# Patient Record
Sex: FEMALE | Race: BLACK OR AFRICAN AMERICAN | NOT HISPANIC OR LATINO | URBAN - METROPOLITAN AREA
[De-identification: names, ages, dates, MRNs, and addresses within clinical notes are randomized per-mention and may not be internally consistent; named-entity substitution may affect disease eponyms.]

---

## 2021-12-03 ENCOUNTER — INPATIENT (INPATIENT)
Facility: HOSPITAL | Age: 66
LOS: 8 days | Discharge: HOME | DRG: 177 | End: 2021-12-12
Attending: INTERNAL MEDICINE | Admitting: INTERNAL MEDICINE
Payer: COMMERCIAL

## 2021-12-03 VITALS
TEMPERATURE: 99 F | OXYGEN SATURATION: 95 % | HEART RATE: 95 BPM | HEIGHT: 64 IN | WEIGHT: 171.52 LBS | DIASTOLIC BLOOD PRESSURE: 83 MMHG | SYSTOLIC BLOOD PRESSURE: 105 MMHG | RESPIRATION RATE: 20 BRPM

## 2021-12-03 DIAGNOSIS — Z29.9 ENCOUNTER FOR PROPHYLACTIC MEASURES, UNSPECIFIED: ICD-10-CM

## 2021-12-03 DIAGNOSIS — U07.1 COVID-19: ICD-10-CM

## 2021-12-03 DIAGNOSIS — Z90.49 ACQUIRED ABSENCE OF OTHER SPECIFIED PARTS OF DIGESTIVE TRACT: Chronic | ICD-10-CM

## 2021-12-03 DIAGNOSIS — E11.9 TYPE 2 DIABETES MELLITUS WITHOUT COMPLICATIONS: ICD-10-CM

## 2021-12-03 DIAGNOSIS — I10 ESSENTIAL (PRIMARY) HYPERTENSION: ICD-10-CM

## 2021-12-03 DIAGNOSIS — E03.9 HYPOTHYROIDISM, UNSPECIFIED: ICD-10-CM

## 2021-12-03 LAB
ALBUMIN SERPL ELPH-MCNC: 2.6 G/DL — LOW (ref 3.5–5)
ALP SERPL-CCNC: 56 U/L — SIGNIFICANT CHANGE UP (ref 40–120)
ALT FLD-CCNC: 23 U/L DA — SIGNIFICANT CHANGE UP (ref 10–60)
ANION GAP SERPL CALC-SCNC: 9 MMOL/L — SIGNIFICANT CHANGE UP (ref 5–17)
APTT BLD: 33.7 SEC — SIGNIFICANT CHANGE UP (ref 27.5–35.5)
AST SERPL-CCNC: 27 U/L — SIGNIFICANT CHANGE UP (ref 10–40)
BASOPHILS # BLD AUTO: 0.02 K/UL — SIGNIFICANT CHANGE UP (ref 0–0.2)
BASOPHILS NFR BLD AUTO: 0.3 % — SIGNIFICANT CHANGE UP (ref 0–2)
BILIRUB SERPL-MCNC: 0.6 MG/DL — SIGNIFICANT CHANGE UP (ref 0.2–1.2)
BUN SERPL-MCNC: 12 MG/DL — SIGNIFICANT CHANGE UP (ref 7–18)
CALCIUM SERPL-MCNC: 9.4 MG/DL — SIGNIFICANT CHANGE UP (ref 8.4–10.5)
CHLORIDE SERPL-SCNC: 98 MMOL/L — SIGNIFICANT CHANGE UP (ref 96–108)
CO2 SERPL-SCNC: 25 MMOL/L — SIGNIFICANT CHANGE UP (ref 22–31)
CREAT SERPL-MCNC: 1.04 MG/DL — SIGNIFICANT CHANGE UP (ref 0.5–1.3)
EOSINOPHIL # BLD AUTO: 0 K/UL — SIGNIFICANT CHANGE UP (ref 0–0.5)
EOSINOPHIL NFR BLD AUTO: 0 % — SIGNIFICANT CHANGE UP (ref 0–6)
GLUCOSE BLDC GLUCOMTR-MCNC: 173 MG/DL — HIGH (ref 70–99)
GLUCOSE SERPL-MCNC: 193 MG/DL — HIGH (ref 70–99)
HCT VFR BLD CALC: 38.6 % — SIGNIFICANT CHANGE UP (ref 34.5–45)
HGB BLD-MCNC: 12.7 G/DL — SIGNIFICANT CHANGE UP (ref 11.5–15.5)
IMM GRANULOCYTES NFR BLD AUTO: 0.7 % — SIGNIFICANT CHANGE UP (ref 0–1.5)
INR BLD: 1.23 RATIO — HIGH (ref 0.88–1.16)
LACTATE SERPL-SCNC: 1.8 MMOL/L — SIGNIFICANT CHANGE UP (ref 0.7–2)
LYMPHOCYTES # BLD AUTO: 1.32 K/UL — SIGNIFICANT CHANGE UP (ref 1–3.3)
LYMPHOCYTES # BLD AUTO: 22 % — SIGNIFICANT CHANGE UP (ref 13–44)
MCHC RBC-ENTMCNC: 29.7 PG — SIGNIFICANT CHANGE UP (ref 27–34)
MCHC RBC-ENTMCNC: 32.9 GM/DL — SIGNIFICANT CHANGE UP (ref 32–36)
MCV RBC AUTO: 90.2 FL — SIGNIFICANT CHANGE UP (ref 80–100)
MONOCYTES # BLD AUTO: 0.42 K/UL — SIGNIFICANT CHANGE UP (ref 0–0.9)
MONOCYTES NFR BLD AUTO: 7 % — SIGNIFICANT CHANGE UP (ref 2–14)
NEUTROPHILS # BLD AUTO: 4.21 K/UL — SIGNIFICANT CHANGE UP (ref 1.8–7.4)
NEUTROPHILS NFR BLD AUTO: 70 % — SIGNIFICANT CHANGE UP (ref 43–77)
NRBC # BLD: 0 /100 WBCS — SIGNIFICANT CHANGE UP (ref 0–0)
NT-PROBNP SERPL-SCNC: 155 PG/ML — HIGH (ref 0–125)
PLATELET # BLD AUTO: 207 K/UL — SIGNIFICANT CHANGE UP (ref 150–400)
POTASSIUM SERPL-MCNC: 4.2 MMOL/L — SIGNIFICANT CHANGE UP (ref 3.5–5.3)
POTASSIUM SERPL-SCNC: 4.2 MMOL/L — SIGNIFICANT CHANGE UP (ref 3.5–5.3)
PROT SERPL-MCNC: 7.9 G/DL — SIGNIFICANT CHANGE UP (ref 6–8.3)
PROTHROM AB SERPL-ACNC: 14.5 SEC — HIGH (ref 10.6–13.6)
RBC # BLD: 4.28 M/UL — SIGNIFICANT CHANGE UP (ref 3.8–5.2)
RBC # FLD: 12.9 % — SIGNIFICANT CHANGE UP (ref 10.3–14.5)
SARS-COV-2 RNA SPEC QL NAA+PROBE: DETECTED
SODIUM SERPL-SCNC: 132 MMOL/L — LOW (ref 135–145)
TROPONIN I, HIGH SENSITIVITY RESULT: 49.6 NG/L — SIGNIFICANT CHANGE UP
WBC # BLD: 6.01 K/UL — SIGNIFICANT CHANGE UP (ref 3.8–10.5)
WBC # FLD AUTO: 6.01 K/UL — SIGNIFICANT CHANGE UP (ref 3.8–10.5)

## 2021-12-03 PROCEDURE — 99285 EMERGENCY DEPT VISIT HI MDM: CPT

## 2021-12-03 PROCEDURE — 71046 X-RAY EXAM CHEST 2 VIEWS: CPT | Mod: 26

## 2021-12-03 PROCEDURE — 93010 ELECTROCARDIOGRAM REPORT: CPT | Mod: 77

## 2021-12-03 PROCEDURE — 93010 ELECTROCARDIOGRAM REPORT: CPT

## 2021-12-03 RX ORDER — DEXAMETHASONE 0.5 MG/5ML
6 ELIXIR ORAL ONCE
Refills: 0 | Status: COMPLETED | OUTPATIENT
Start: 2021-12-03 | End: 2021-12-03

## 2021-12-03 RX ORDER — REMDESIVIR 5 MG/ML
200 INJECTION INTRAVENOUS EVERY 24 HOURS
Refills: 0 | Status: COMPLETED | OUTPATIENT
Start: 2021-12-03 | End: 2021-12-04

## 2021-12-03 RX ORDER — INSULIN LISPRO 100/ML
VIAL (ML) SUBCUTANEOUS AT BEDTIME
Refills: 0 | Status: DISCONTINUED | OUTPATIENT
Start: 2021-12-03 | End: 2021-12-12

## 2021-12-03 RX ORDER — METOPROLOL TARTRATE 50 MG
1 TABLET ORAL
Qty: 0 | Refills: 0 | DISCHARGE

## 2021-12-03 RX ORDER — ENOXAPARIN SODIUM 100 MG/ML
40 INJECTION SUBCUTANEOUS DAILY
Refills: 0 | Status: DISCONTINUED | OUTPATIENT
Start: 2021-12-03 | End: 2021-12-12

## 2021-12-03 RX ORDER — LEVOTHYROXINE SODIUM 125 MCG
88 TABLET ORAL DAILY
Refills: 0 | Status: DISCONTINUED | OUTPATIENT
Start: 2021-12-03 | End: 2021-12-12

## 2021-12-03 RX ORDER — METOPROLOL TARTRATE 50 MG
25 TABLET ORAL DAILY
Refills: 0 | Status: DISCONTINUED | OUTPATIENT
Start: 2021-12-03 | End: 2021-12-12

## 2021-12-03 RX ORDER — PANTOPRAZOLE SODIUM 20 MG/1
40 TABLET, DELAYED RELEASE ORAL
Refills: 0 | Status: DISCONTINUED | OUTPATIENT
Start: 2021-12-03 | End: 2021-12-12

## 2021-12-03 RX ORDER — LEVOTHYROXINE SODIUM 125 MCG
1 TABLET ORAL
Qty: 0 | Refills: 0 | DISCHARGE

## 2021-12-03 RX ORDER — LOSARTAN POTASSIUM 100 MG/1
1 TABLET, FILM COATED ORAL
Qty: 0 | Refills: 0 | DISCHARGE

## 2021-12-03 RX ORDER — SODIUM CHLORIDE 9 MG/ML
1000 INJECTION INTRAMUSCULAR; INTRAVENOUS; SUBCUTANEOUS ONCE
Refills: 0 | Status: COMPLETED | OUTPATIENT
Start: 2021-12-03 | End: 2021-12-03

## 2021-12-03 RX ORDER — PANTOPRAZOLE SODIUM 20 MG/1
40 TABLET, DELAYED RELEASE ORAL ONCE
Refills: 0 | Status: COMPLETED | OUTPATIENT
Start: 2021-12-03 | End: 2021-12-04

## 2021-12-03 RX ORDER — REMDESIVIR 5 MG/ML
100 INJECTION INTRAVENOUS EVERY 24 HOURS
Refills: 0 | Status: COMPLETED | OUTPATIENT
Start: 2021-12-04 | End: 2021-12-07

## 2021-12-03 RX ORDER — AMLODIPINE BESYLATE 2.5 MG/1
1 TABLET ORAL
Qty: 0 | Refills: 0 | DISCHARGE

## 2021-12-03 RX ORDER — REMDESIVIR 5 MG/ML
INJECTION INTRAVENOUS
Refills: 0 | Status: COMPLETED | OUTPATIENT
Start: 2021-12-04 | End: 2021-12-07

## 2021-12-03 RX ORDER — INSULIN LISPRO 100/ML
VIAL (ML) SUBCUTANEOUS
Refills: 0 | Status: DISCONTINUED | OUTPATIENT
Start: 2021-12-03 | End: 2021-12-12

## 2021-12-03 RX ADMIN — SODIUM CHLORIDE 1000 MILLILITER(S): 9 INJECTION INTRAMUSCULAR; INTRAVENOUS; SUBCUTANEOUS at 16:27

## 2021-12-03 RX ADMIN — Medication 6 MILLIGRAM(S): at 22:54

## 2021-12-03 NOTE — ED PROVIDER NOTE - CPE EDP CARDIAC NORM
From: Jania Younger  To: Juliette Howard MD  Sent: 2/22/2018 1:54 PM CST  Subject: medication    I was wondering if I can take the suppositories the same day I use the premarin??    Thanks,  Joann Younger   normal...

## 2021-12-03 NOTE — ED ADULT NURSE NOTE - DRUG PRE-SCREENING (DAST -1)
Gladys Whitehead, PGY1   Contact/Pager - [phone] / [phone]    SUBJECTIVE / OVERNIGHT EVENTS:  -no acute events overnight  -denies any complaints including CP, SOB, abdominal pain, N/V, diarrhea, constipation, headache, confusion, fever/chills      MEDICATIONS  (STANDING):  pantoprazole  Injectable 40 milliGRAM(s) IV Push every 12 hours  sodium chloride 0.9%. 1000 milliLiter(s) (100 mL/Hr) IV Continuous <Continuous>    MEDICATIONS  (PRN):  trimethobenzamide 300 milliGRAM(s) Oral every 8 hours PRN Nausea and/or Vomiting      Allergies    No Known Allergies    Intolerances          Vital Signs Last 24 Hrs  T(C): 36.8 (2019 04:26), Max: 37 (2019 16:57)  T(F): 98.3 (2019 04:26), Max: 98.6 (2019 16:57)  HR: 77 (2019 04:26) (77 - 91)  BP: 152/95 (2019 04:26) (132/91 - 152/100)  BP(mean): --  RR: 18 (2019 04:26) (18 - 18)  SpO2: 99% (2019 04:26) (98% - 100%)  CAPILLARY BLOOD GLUCOSE        I&O's Summary    2019 07:01  -  2019 07:00  --------------------------------------------------------  IN: 1000 mL / OUT: 0 mL / NET: 1000 mL          PHYSICAL EXAM:  GENERAL: NAD, well-developed  HEAD:  AT, NC  EYES: EOMI, PERRLA, conjunctiva and sclera clear  ENMT: Airway patent. MMM. Good dentition, no lesions.  NECK: Supple, No JVD  CHEST/LUNG: CTABL; No wheezing, rhonci, or rales  HEART: RRR; Normal S1, S2. No murmurs, rubs, or gallops  ABDOMEN: Soft, NT, ND; Bowel sounds present. No organomegaly  EXTREMITIES:  2+ Peripheral Pulses, No clubbing, cyanosis, or edema  PSYCH: AAOx3  NEUROLOGY: non-focal  SKIN: Warm, dry, intact; No rashes or lesions      LABS:                        12.8   3.7   )-----------( x        ( 2019 06:12 )             36.8         138  |  99  |  29<H>  ----------------------------<  104<H>  3.2<L>   |  26  |  1.54<H>    Ca    8.7      2019 06:11  Phos  3.1       Mg     2.3         TPro  7.1  /  Alb  4.2  /  TBili  0.9  /  DBili  x   /  AST  56<H>  /  ALT  42  /  AlkPhos  50  12    LIVER FUNCTIONS - ( 2019 06:11 )  Alb: 4.2 g/dL / Pro: 7.1 g/dL / ALK PHOS: 50 U/L / ALT: 42 U/L / AST: 56 U/L / GGT: x           PT/INR - ( 2019 06:12 )   PT: 12.9 sec;   INR: 1.13 ratio         PTT - ( 2019 06:12 )  PTT:25.4 sec      Urinalysis Basic - ( 2019 00:17 )    Color: Light Yellow / Appearance: Clear / S.012 / pH: x  Gluc: x / Ketone: Trace  / Bili: Negative / Urobili: Negative   Blood: x / Protein: Trace / Nitrite: Negative   Leuk Esterase: Negative / RBC: 4 /hpf / WBC 3 /HPF   Sq Epi: x / Non Sq Epi: 1 /hpf / Bacteria: 0.0              RADIOLOGY & ADDITIONAL TESTS:    Imaging Personally Reviewed: YES    Consultant(s) Notes Reviewed: YES    Care Discussed with Consultants/Other Providers: YES Statement Selected

## 2021-12-03 NOTE — ED PROVIDER NOTE - CARE PLAN
1 Principal Discharge DX:	Pneumonia due to COVID-19 virus  Secondary Diagnosis:	Hypoxemia requiring supplemental oxygen

## 2021-12-03 NOTE — ED PROVIDER NOTE - OBJECTIVE STATEMENT
66 y.o female with a history of hypertension and diabetes mellitus type II is presenting with x1 week of cough and diarrhea followed by shortness of breath which started yesterday. Patient reports of receiving 1 of 2 COVID-19 vaccinations. Patient denies leg edema, fever, travel, or sick contacts.

## 2021-12-03 NOTE — ED PROVIDER NOTE - CLINICAL SUMMARY MEDICAL DECISION MAKING FREE TEXT BOX
66 y.o female presenting with x1 week of cough and diarrhea along with shortness of breath starting yesterday. Symptoms suggest pneumonia or possible COVID-19. Will perform chest x-ray, COVID-19 swab, and reassess.

## 2021-12-03 NOTE — H&P ADULT - PROBLEM SELECTOR PLAN 4
- Patient with hx of hypothyroidism on Synthroid at home   - C/w home meds   - F/u TSH and adjust medications if needed

## 2021-12-03 NOTE — H&P ADULT - ATTENDING COMMENTS
PATIENT WAS SEEN AND EXAMINED , D/W ER MD, RAKAN    - HYPOXIC RESPIRATORY FAILURE DUE TO COVID 19 PNEUMONIA - STARTED ON IV.REMDESVIR AND DECDRON, O2 SUPPLEMENTS. ICU CONSULT TO BE CALLED BY RAKAN TO EVALUATE HYPOXIA USING NRB  - ISOLATION DROPLET   - GI AND DVT PROPHYLAXIS    - DR. SUN LEARY

## 2021-12-03 NOTE — ED ADULT TRIAGE NOTE - HEIGHT IN INCHES
Pain in knee started Saturday. States turned wrong. Felt like knee 'gave out' a little. Now feeling some grinding in knee. Reports swelling, sore. Today is somewhat better. Right knee. No prior injury. Has been resting, icing and taking it easy. feeling some better today.
4

## 2021-12-03 NOTE — H&P ADULT - HISTORY OF PRESENT ILLNESS
66 year old female from home, lives alone, has past medical hx of hypertension, DM, hypothyroidism came to ED c/o shortness of breath for 2 days that worsened today associated to dry cough and fatigue. As per patient, generalized weakness started about 4 days ago associated to tiredness, poor appetite and low energy. Patient denies any fever, chills, sick contacts, nausea, vomiting, diarrhea, bleeding, chest pain or palpitations. Of note patient was vaccinated on April with [employer].

## 2021-12-03 NOTE — ED ADULT NURSE NOTE - OBJECTIVE STATEMENT
pt is here for shortness of breath.  pt stated that diarrhea, cold and coughing x 1 week, getting worseing coughing, SOB, and weakness today, fever

## 2021-12-03 NOTE — H&P ADULT - PROBLEM SELECTOR PLAN 3
- Patient has hx of DM on glipizide, pioglitazone, alogliptin metformin, dapagliflozin at home  - Holding home po medications  - Started on HSS  - Fingerstick before meals and at bedtime  - DASH diet with consistent carb  - Target -180  - F/u A1c

## 2021-12-03 NOTE — H&P ADULT - PROBLEM SELECTOR PLAN 1
- On admission, patient was c/o cough, sob, sat 77% on RA and 93-96% on NRB 15L  - CXR showed bilateral opacities  - EKG NSR, troponin x1 neg  - COVID19 PCR +ve  - F/u COVID19 AB, LDH, Procalcitonin, ferritin, D-Dimer, CRP every 3 days   - F/u Blood cultures, urine Legionella, Strep antigen, Mycoplasma antigen   - Tylenol, Albuterol Inhaler, Robitussin PRN  - Monitor respiratory status   - Started on Decadron 6 mg IV daily for 10 days and Remdesivir   - C/w O2 supplementation to keep O2 sat >92%  - Isolation precautions - On admission, patient was c/o cough, sob, sat 77% on RA and 93-96% on NRB 15L  - CXR showed bilateral opacities  - EKG NSR, troponin x1 neg  - COVID19 PCR +ve  - F/u COVID19 AB, LDH, Procalcitonin, ferritin, D-Dimer, CRP every 3 days   - F/u Blood cultures, urine Legionella, Strep antigen, Mycoplasma antigen   - Tylenol, Albuterol Inhaler, Robitussin PRN  - Monitor respiratory status   - Started on Decadron 6 mg IV daily for 10 days and Remdesivir   - C/w O2 supplementation to keep O2 sat >92%  - F/u CTA chest to r/o PE  - Monitor for hemodynamic instability   - Isolation precautions

## 2021-12-03 NOTE — H&P ADULT - PROBLEM SELECTOR PLAN 2
- Patient has history of Hypertension on amlodipine, metoprolol, losartan at home   - Hold meds for now as patient is normotensive   - DASH diet  - Monitor BP and resume meds as needed

## 2021-12-03 NOTE — PROGRESS NOTE ADULT - SUBJECTIVE AND OBJECTIVE BOX
Patient is a 66y old  Female who presents with a chief complaint of AHRF 2/2 COVID19 PNA (03 Dec 2021 20:55)    PATIENT IS SEEN AND EXAMINED IN MEDICAL FLOOR.    OZZY [    ]    JUAN PABLO [   ]      GT [   ]    ALLERGIES:  No Known Allergies      Daily Height in cm: 162.56 (03 Dec 2021 15:35)    Daily     VITALS:    Vital Signs Last 24 Hrs  T(C): 37.3 (03 Dec 2021 15:35), Max: 37.3 (03 Dec 2021 15:35)  T(F): 99.1 (03 Dec 2021 15:35), Max: 99.1 (03 Dec 2021 15:35)  HR: 95 (03 Dec 2021 15:35) (95 - 95)  BP: 105/83 (03 Dec 2021 15:35) (105/83 - 105/83)  BP(mean): --  RR: 20 (03 Dec 2021 15:35) (20 - 20)  SpO2: 95% (03 Dec 2021 15:35) (95% - 95%)    LABS:    CBC Full  -  ( 03 Dec 2021 16:21 )  WBC Count : 6.01 K/uL  RBC Count : 4.28 M/uL  Hemoglobin : 12.7 g/dL  Hematocrit : 38.6 %  Platelet Count - Automated : 207 K/uL  Mean Cell Volume : 90.2 fl  Mean Cell Hemoglobin : 29.7 pg  Mean Cell Hemoglobin Concentration : 32.9 gm/dL  Auto Neutrophil # : 4.21 K/uL  Auto Lymphocyte # : 1.32 K/uL  Auto Monocyte # : 0.42 K/uL  Auto Eosinophil # : 0.00 K/uL  Auto Basophil # : 0.02 K/uL  Auto Neutrophil % : 70.0 %  Auto Lymphocyte % : 22.0 %  Auto Monocyte % : 7.0 %  Auto Eosinophil % : 0.0 %  Auto Basophil % : 0.3 %    PT/INR - ( 03 Dec 2021 16:21 )   PT: 14.5 sec;   INR: 1.23 ratio         PTT - ( 03 Dec 2021 16:21 )  PTT:33.7 sec  12-03    132<L>  |  98  |  12  ----------------------------<  193<H>  4.2   |  25  |  1.04    Ca    9.4      03 Dec 2021 16:21    TPro  7.9  /  Alb  2.6<L>  /  TBili  0.6  /  DBili  x   /  AST  27  /  ALT  23  /  AlkPhos  56  12-03    CAPILLARY BLOOD GLUCOSE      POCT Blood Glucose.: 192 mg/dL (03 Dec 2021 15:50)        LIVER FUNCTIONS - ( 03 Dec 2021 16:21 )  Alb: 2.6 g/dL / Pro: 7.9 g/dL / ALK PHOS: 56 U/L / ALT: 23 U/L DA / AST: 27 U/L / GGT: x           Creatinine Trend: 1.04<--  I&O's Summary              MEDICATIONS:    MEDICATIONS  (STANDING):  enoxaparin Injectable 40 milliGRAM(s) SubCutaneous daily  insulin lispro (ADMELOG) corrective regimen sliding scale   SubCutaneous three times a day before meals  insulin lispro (ADMELOG) corrective regimen sliding scale   SubCutaneous at bedtime  pantoprazole    Tablet 40 milliGRAM(s) Oral before breakfast  pantoprazole  Injectable 40 milliGRAM(s) IV Push once  remdesivir  IVPB   IV Intermittent   remdesivir  IVPB 200 milliGRAM(s) IV Intermittent every 24 hours      MEDICATIONS  (PRN):        REVIEW OF SYSTEMS:                           ALL ROS DONE [ X   ]      CONSTITUTIONAL:  LETHARGIC [   ], FEVER [   ], UNRESPONSIVE [   ]  CVS:  CP  [   ], SOB, [   ], PALPITATIONS [   ], DIZZYNESS [   ]  RS: COUGH [   ], SPUTUM [   ]  GI: ABDOMINAL PAIN [   ], NAUSEA [   ], VOMITINGS [   ], DIARRHEA [   ], CONSTIPATION [   ]  :  DYSURIA [   ], NOCTURIA [   ], INCREASED FREQUENCY [   ], DRIBLING [   ],  SKELETAL: PAINFUL JOINTS [   ], SWOLLEN JOINTS [   ], NECK ACHE [   ], LOW BACK ACHE [   ],  SKIN : ULCERS [   ], RASH [   ], ITCHING [   ]  CNS: HEAD ACHE [   ], DOUBLE VISION [   ], BLURRED VISION [   ], AMS / CONFUSION [   ], SEIZURES [   ], WEAKNESS [   ],TINGLING / NUMBNESS [   ]      PHYSICAL EXAMINATION:    GENERAL APPEARANCE: NO DISTRESS  HEENT:  NO PALLOR, NO  JVD,  NO   NODES, NECK SUPPLE  CVS: S1 +, S2 +,   RS: AEEB,  OCCASIONAL  RALES +,   NO RONCHI  ABD: SOFT, NT, NO, BS +  EXT: NO PE  SKIN: WARM,   SKELETAL:  ROM REDUCED AT CERVICAL & LS SPINE   CNS:  AAO X 3   , NO  DEFICITS        RADIOLOGY :    B/L LUNG INFILTRATES           ASSESSMENT :     Infection due to severe acute respiratory syndrome coronavirus 2 (SARS-CoV-2)    Hypertension    Diabetes mellitus    Hypothyroidism    History of cholecystectomy    History of appendectomy        PLAN:  HPI:  66 year old female from home, lives alone, has past medical hx of hypertension, DM, hypothyroidism came to ED c/o shortness of breath for 2 days that worsened today associated to dry cough and fatigue. As per patient, generalized weakness started about 4 days ago associated to tiredness, poor appetite and low energy. Patient denies any fever, chills, sick contacts, nausea, vomiting, diarrhea, bleeding, chest pain or palpitations. Of note patient was vaccinated on April with [employer]  (03 Dec 2021 20:55)    - HYPOXIC RESPIRATORY FAILURE DUE TO COVID 19 PNEUMONIA - STARTED ON IV.REMDESVIR AND DECDRON, O2 SUPPLEMENTS. ICU CONSULT TO BE CALLED BY RMD TO EVALUATE HYPOXIA USING NRB  - ISOLATION DROPLET   - GI AND DVT PROPHYLAXIS    - DR. SUN LEARY

## 2021-12-03 NOTE — H&P ADULT - NSHPREVIEWOFSYSTEMS_GEN_ALL_CORE
Constitutional- no fever, chills, weight loss, or weight gain, generalized weakness+  Eyes – no vision loss or changes, no pain, no redness  ENT  – No hearing changes, no tinnitus, no discharge, no pain  - No runny nose, no congestion, no pain  - No sore throat, no hoarseness, no mouth sores, no voice change  CVS – No chest pain/no palpitations, or SOB  Respiratory -  cough+, no hemoptysis, no wheezing, SOB+  GI – no dysphagia, heartburn, nausea, anorexia, emesis, diarrhea, abd pain, or change in bowel habits   – no frequency, urgency, hesitancy, nocturia, discharge, or weak stream  Skin – no rashes, lumps, or pruritus  MANSOOR – no joint pain, stiffness, back pain, redness or swelling in joints  Psych – no confusion, depression , anxiety, or insomnia  Neuro – no headache dizziness, syncope, seizures, tremors, numbness, amnesia, or falls  Endocrine – no cold, heat intolerance, sweating, excessive hunger or thirst

## 2021-12-03 NOTE — H&P ADULT - ASSESSMENT
66 year old female has past medical hx of hypertension, DM, hypothyroidism is admitted to medicine for AHRF 2/2 COVID19 PNA

## 2021-12-03 NOTE — ED ADULT TRIAGE NOTE - CHIEF COMPLAINT QUOTE
Diarrhea, cold and coughing x 1 week. Worsening coughing, SOB, and weakness today. PT denies CP, fever

## 2021-12-04 LAB
ALBUMIN SERPL ELPH-MCNC: 2.5 G/DL — LOW (ref 3.5–5)
ALP SERPL-CCNC: 61 U/L — SIGNIFICANT CHANGE UP (ref 40–120)
ALT FLD-CCNC: 22 U/L DA — SIGNIFICANT CHANGE UP (ref 10–60)
ANION GAP SERPL CALC-SCNC: 14 MMOL/L — SIGNIFICANT CHANGE UP (ref 5–17)
ANISOCYTOSIS BLD QL: SLIGHT — SIGNIFICANT CHANGE UP
APPEARANCE UR: CLEAR — SIGNIFICANT CHANGE UP
AST SERPL-CCNC: 23 U/L — SIGNIFICANT CHANGE UP (ref 10–40)
BACTERIA # UR AUTO: ABNORMAL /HPF
BASOPHILS # BLD AUTO: 0.07 K/UL — SIGNIFICANT CHANGE UP (ref 0–0.2)
BASOPHILS NFR BLD AUTO: 1 % — SIGNIFICANT CHANGE UP (ref 0–2)
BILIRUB SERPL-MCNC: 0.5 MG/DL — SIGNIFICANT CHANGE UP (ref 0.2–1.2)
BILIRUB UR-MCNC: NEGATIVE — SIGNIFICANT CHANGE UP
BUN SERPL-MCNC: 15 MG/DL — SIGNIFICANT CHANGE UP (ref 7–18)
CALCIUM SERPL-MCNC: 9.1 MG/DL — SIGNIFICANT CHANGE UP (ref 8.4–10.5)
CHLORIDE SERPL-SCNC: 102 MMOL/L — SIGNIFICANT CHANGE UP (ref 96–108)
CO2 SERPL-SCNC: 22 MMOL/L — SIGNIFICANT CHANGE UP (ref 22–31)
COLOR SPEC: YELLOW — SIGNIFICANT CHANGE UP
CREAT SERPL-MCNC: 0.8 MG/DL — SIGNIFICANT CHANGE UP (ref 0.5–1.3)
CRP SERPL-MCNC: 205 MG/L — HIGH
D DIMER BLD IA.RAPID-MCNC: 506 NG/ML DDU — HIGH
DACRYOCYTES BLD QL SMEAR: SLIGHT — SIGNIFICANT CHANGE UP
DIFF PNL FLD: NEGATIVE — SIGNIFICANT CHANGE UP
EOSINOPHIL # BLD AUTO: 0 K/UL — SIGNIFICANT CHANGE UP (ref 0–0.5)
EOSINOPHIL NFR BLD AUTO: 0 % — SIGNIFICANT CHANGE UP (ref 0–6)
EPI CELLS # UR: ABNORMAL /HPF
ERYTHROCYTE [SEDIMENTATION RATE] IN BLOOD: 92 MM/HR — HIGH (ref 0–20)
FERRITIN SERPL-MCNC: 628 NG/ML — HIGH (ref 15–150)
GIANT PLATELETS BLD QL SMEAR: PRESENT — SIGNIFICANT CHANGE UP
GLUCOSE BLDC GLUCOMTR-MCNC: 170 MG/DL — HIGH (ref 70–99)
GLUCOSE BLDC GLUCOMTR-MCNC: 245 MG/DL — HIGH (ref 70–99)
GLUCOSE BLDC GLUCOMTR-MCNC: 252 MG/DL — HIGH (ref 70–99)
GLUCOSE SERPL-MCNC: 210 MG/DL — HIGH (ref 70–99)
GLUCOSE UR QL: 1000 MG/DL
HCT VFR BLD CALC: 38.2 % — SIGNIFICANT CHANGE UP (ref 34.5–45)
HCV AB S/CO SERPL IA: 0.12 S/CO — SIGNIFICANT CHANGE UP (ref 0–0.99)
HCV AB SERPL-IMP: SIGNIFICANT CHANGE UP
HGB BLD-MCNC: 12.6 G/DL — SIGNIFICANT CHANGE UP (ref 11.5–15.5)
INR BLD: 1.28 RATIO — HIGH (ref 0.88–1.16)
KETONES UR-MCNC: ABNORMAL
LDH SERPL L TO P-CCNC: 337 U/L — HIGH (ref 120–225)
LEUKOCYTE ESTERASE UR-ACNC: NEGATIVE — SIGNIFICANT CHANGE UP
LYMPHOCYTES # BLD AUTO: 0.55 K/UL — LOW (ref 1–3.3)
LYMPHOCYTES # BLD AUTO: 8 % — LOW (ref 13–44)
MACROCYTES BLD QL: SLIGHT — SIGNIFICANT CHANGE UP
MAGNESIUM SERPL-MCNC: 2.3 MG/DL — SIGNIFICANT CHANGE UP (ref 1.6–2.6)
MANUAL SMEAR VERIFICATION: SIGNIFICANT CHANGE UP
MCHC RBC-ENTMCNC: 29.1 PG — SIGNIFICANT CHANGE UP (ref 27–34)
MCHC RBC-ENTMCNC: 33 GM/DL — SIGNIFICANT CHANGE UP (ref 32–36)
MCV RBC AUTO: 88.2 FL — SIGNIFICANT CHANGE UP (ref 80–100)
MONOCYTES # BLD AUTO: 0.34 K/UL — SIGNIFICANT CHANGE UP (ref 0–0.9)
MONOCYTES NFR BLD AUTO: 5 % — SIGNIFICANT CHANGE UP (ref 2–14)
NEUTROPHILS # BLD AUTO: 5.79 K/UL — SIGNIFICANT CHANGE UP (ref 1.8–7.4)
NEUTROPHILS NFR BLD AUTO: 84 % — HIGH (ref 43–77)
NITRITE UR-MCNC: NEGATIVE — SIGNIFICANT CHANGE UP
NRBC # BLD: 0 /100 — SIGNIFICANT CHANGE UP (ref 0–0)
OVALOCYTES BLD QL SMEAR: SLIGHT — SIGNIFICANT CHANGE UP
PH UR: 5 — SIGNIFICANT CHANGE UP (ref 5–8)
PHOSPHATE SERPL-MCNC: 4 MG/DL — SIGNIFICANT CHANGE UP (ref 2.5–4.5)
PLAT MORPH BLD: NORMAL — SIGNIFICANT CHANGE UP
PLATELET # BLD AUTO: 239 K/UL — SIGNIFICANT CHANGE UP (ref 150–400)
POTASSIUM SERPL-MCNC: 4.9 MMOL/L — SIGNIFICANT CHANGE UP (ref 3.5–5.3)
POTASSIUM SERPL-SCNC: 4.9 MMOL/L — SIGNIFICANT CHANGE UP (ref 3.5–5.3)
PROCALCITONIN SERPL-MCNC: 0.13 NG/ML — HIGH (ref 0.02–0.1)
PROT SERPL-MCNC: 7.3 G/DL — SIGNIFICANT CHANGE UP (ref 6–8.3)
PROT UR-MCNC: 30 MG/DL
PROTHROM AB SERPL-ACNC: 15 SEC — HIGH (ref 10.6–13.6)
RBC # BLD: 4.33 M/UL — SIGNIFICANT CHANGE UP (ref 3.8–5.2)
RBC # FLD: 12.5 % — SIGNIFICANT CHANGE UP (ref 10.3–14.5)
RBC BLD AUTO: ABNORMAL
RBC CASTS # UR COMP ASSIST: SIGNIFICANT CHANGE UP /HPF (ref 0–2)
SODIUM SERPL-SCNC: 138 MMOL/L — SIGNIFICANT CHANGE UP (ref 135–145)
SP GR SPEC: 1.02 — SIGNIFICANT CHANGE UP (ref 1.01–1.02)
TSH SERPL-MCNC: 1.6 UU/ML — SIGNIFICANT CHANGE UP (ref 0.34–4.82)
UROBILINOGEN FLD QL: NEGATIVE — SIGNIFICANT CHANGE UP
VARIANT LYMPHS # BLD: 2 % — SIGNIFICANT CHANGE UP (ref 0–6)
WBC # BLD: 6.89 K/UL — SIGNIFICANT CHANGE UP (ref 3.8–10.5)
WBC # FLD AUTO: 6.89 K/UL — SIGNIFICANT CHANGE UP (ref 3.8–10.5)
WBC UR QL: SIGNIFICANT CHANGE UP /HPF (ref 0–5)

## 2021-12-04 PROCEDURE — 99291 CRITICAL CARE FIRST HOUR: CPT

## 2021-12-04 RX ORDER — INFLUENZA VIRUS VACCINE 15; 15; 15; 15 UG/.5ML; UG/.5ML; UG/.5ML; UG/.5ML
0.7 SUSPENSION INTRAMUSCULAR ONCE
Refills: 0 | Status: COMPLETED | OUTPATIENT
Start: 2021-12-04 | End: 2021-12-04

## 2021-12-04 RX ORDER — DEXAMETHASONE 0.5 MG/5ML
6 ELIXIR ORAL DAILY
Refills: 0 | Status: COMPLETED | OUTPATIENT
Start: 2021-12-04 | End: 2021-12-12

## 2021-12-04 RX ORDER — ACETAMINOPHEN 500 MG
650 TABLET ORAL EVERY 6 HOURS
Refills: 0 | Status: DISCONTINUED | OUTPATIENT
Start: 2021-12-04 | End: 2021-12-12

## 2021-12-04 RX ORDER — CHLORHEXIDINE GLUCONATE 213 G/1000ML
1 SOLUTION TOPICAL
Refills: 0 | Status: DISCONTINUED | OUTPATIENT
Start: 2021-12-04 | End: 2021-12-09

## 2021-12-04 RX ORDER — SENNA PLUS 8.6 MG/1
2 TABLET ORAL AT BEDTIME
Refills: 0 | Status: DISCONTINUED | OUTPATIENT
Start: 2021-12-04 | End: 2021-12-05

## 2021-12-04 RX ORDER — ALBUTEROL 90 UG/1
2 AEROSOL, METERED ORAL EVERY 6 HOURS
Refills: 0 | Status: DISCONTINUED | OUTPATIENT
Start: 2021-12-04 | End: 2021-12-12

## 2021-12-04 RX ADMIN — ENOXAPARIN SODIUM 40 MILLIGRAM(S): 100 INJECTION SUBCUTANEOUS at 12:51

## 2021-12-04 RX ADMIN — Medication 200 MILLIGRAM(S): at 22:12

## 2021-12-04 RX ADMIN — Medication 2: at 06:20

## 2021-12-04 RX ADMIN — PANTOPRAZOLE SODIUM 40 MILLIGRAM(S): 20 TABLET, DELAYED RELEASE ORAL at 10:08

## 2021-12-04 RX ADMIN — Medication 1: at 21:20

## 2021-12-04 RX ADMIN — Medication 2: at 18:06

## 2021-12-04 RX ADMIN — Medication 1: at 13:55

## 2021-12-04 RX ADMIN — REMDESIVIR 200 MILLIGRAM(S): 5 INJECTION INTRAVENOUS at 02:09

## 2021-12-04 RX ADMIN — PANTOPRAZOLE SODIUM 40 MILLIGRAM(S): 20 TABLET, DELAYED RELEASE ORAL at 02:09

## 2021-12-04 RX ADMIN — Medication 6 MILLIGRAM(S): at 12:51

## 2021-12-04 RX ADMIN — SENNA PLUS 2 TABLET(S): 8.6 TABLET ORAL at 21:03

## 2021-12-04 RX ADMIN — CHLORHEXIDINE GLUCONATE 1 APPLICATION(S): 213 SOLUTION TOPICAL at 06:20

## 2021-12-04 NOTE — PROGRESS NOTE ADULT - SUBJECTIVE AND OBJECTIVE BOX
Patient is a 66y old  Female who presents with a chief complaint of AHRF 2/2 COVID19 PNA (04 Dec 2021 00:43)    PATIENT IS SEEN AND EXAMINED IN MEDICAL FLOOR.      ALLERGIES:  No Known Allergies      Daily Height in cm: 162.56 (03 Dec 2021 15:35)    Daily     VITALS:    Vital Signs Last 24 Hrs  T(C): 36.6 (04 Dec 2021 05:00), Max: 37.3 (03 Dec 2021 15:35)  T(F): 97.8 (04 Dec 2021 05:00), Max: 99.1 (03 Dec 2021 15:35)  HR: 91 (04 Dec 2021 06:00) (88 - 105)  BP: 130/58 (04 Dec 2021 06:00) (105/83 - 135/79)  BP(mean): 85 (04 Dec 2021 06:00) (75 - 85)  RR: 48 (04 Dec 2021 06:00) (19 - 48)  SpO2: 100% (04 Dec 2021 06:00) (94% - 100%)    LABS:    CBC Full  -  ( 04 Dec 2021 05:50 )  WBC Count : 6.89 K/uL  RBC Count : 4.33 M/uL  Hemoglobin : 12.6 g/dL  Hematocrit : 38.2 %  Platelet Count - Automated : 239 K/uL  Mean Cell Volume : 88.2 fl  Mean Cell Hemoglobin : 29.1 pg  Mean Cell Hemoglobin Concentration : 33.0 gm/dL  Auto Neutrophil # : 5.79 K/uL  Auto Lymphocyte # : 0.55 K/uL  Auto Monocyte # : 0.34 K/uL  Auto Eosinophil # : 0.00 K/uL  Auto Basophil # : 0.07 K/uL  Auto Neutrophil % : 84.0 %  Auto Lymphocyte % : 8.0 %  Auto Monocyte % : 5.0 %  Auto Eosinophil % : 0.0 %  Auto Basophil % : 1.0 %    PT/INR - ( 04 Dec 2021 05:45 )   PT: 15.0 sec;   INR: 1.28 ratio         PTT - ( 03 Dec 2021 16:21 )  PTT:33.7 sec  12-04    138  |  102  |  15  ----------------------------<  210<H>  4.9   |  22  |  0.80    Ca    9.1      04 Dec 2021 05:50  Phos  4.0     12-04  Mg     2.3     12-04    TPro  7.3  /  Alb  2.5<L>  /  TBili  0.5  /  DBili  x   /  AST  23  /  ALT  22  /  AlkPhos  61  12-04      CAPILLARY BLOOD GLUCOSE    POCT Blood Glucose.: 173 mg/dL (03 Dec 2021 23:45)  POCT Blood Glucose.: 192 mg/dL (03 Dec 2021 15:50)        LIVER FUNCTIONS - ( 04 Dec 2021 05:50 )  Alb: 2.5 g/dL / Pro: 7.3 g/dL / ALK PHOS: 61 U/L / ALT: 22 U/L DA / AST: 23 U/L / GGT: x           Creatinine Trend: 0.80<--, 1.04<--  I&O's Summary    03 Dec 2021 07:01  -  04 Dec 2021 07:00  --------------------------------------------------------  IN: 0 mL / OUT: 550 mL / NET: -550 mL      MEDICATIONS:    MEDICATIONS  (STANDING):  chlorhexidine 2% Cloths 1 Application(s) Topical <User Schedule>  dexAMETHasone  Injectable 6 milliGRAM(s) IV Push daily  enoxaparin Injectable 40 milliGRAM(s) SubCutaneous daily  influenza  Vaccine (HIGH DOSE) 0.7 milliLiter(s) IntraMuscular once  insulin lispro (ADMELOG) corrective regimen sliding scale   SubCutaneous three times a day before meals  insulin lispro (ADMELOG) corrective regimen sliding scale   SubCutaneous at bedtime  levothyroxine 88 MICROGram(s) Oral daily  metoprolol succinate ER 25 milliGRAM(s) Oral daily  pantoprazole    Tablet 40 milliGRAM(s) Oral before breakfast  remdesivir  IVPB   IV Intermittent   remdesivir  IVPB 100 milliGRAM(s) IV Intermittent every 24 hours  senna 2 Tablet(s) Oral at bedtime      MEDICATIONS  (PRN):  acetaminophen     Tablet .. 650 milliGRAM(s) Oral every 6 hours PRN Temp greater or equal to 38C (100.4F), Mild Pain (1 - 3)  ALBUTerol    90 MICROgram(s) HFA Inhaler 2 Puff(s) Inhalation every 6 hours PRN Shortness of Breath and/or Wheezing  guaiFENesin Oral Liquid (Sugar-Free) 200 milliGRAM(s) Oral every 6 hours PRN Cough        REVIEW OF SYSTEMS:                           ALL ROS DONE [ X   ]      CONSTITUTIONAL:  LETHARGIC [   ], FEVER [   ], UNRESPONSIVE [   ]  CVS:  CP  [   ], SOB, [   ], PALPITATIONS [   ], DIZZYNESS [   ]  RS: COUGH [   ], SPUTUM [   ]  GI: ABDOMINAL PAIN [   ], NAUSEA [   ], VOMITINGS [   ], DIARRHEA [   ], CONSTIPATION [   ]  :  DYSURIA [   ], NOCTURIA [   ], INCREASED FREQUENCY [   ], DRIBLING [   ],  SKELETAL: PAINFUL JOINTS [   ], SWOLLEN JOINTS [   ], NECK ACHE [   ], LOW BACK ACHE [   ],  SKIN : ULCERS [   ], RASH [   ], ITCHING [   ]  CNS: HEAD ACHE [   ], DOUBLE VISION [   ], BLURRED VISION [   ], AMS / CONFUSION [   ], SEIZURES [   ], WEAKNESS [   ],TINGLING / NUMBNESS [   ]        PHYSICAL EXAMINATION:    GENERAL APPEARANCE: NO DISTRESS  HEENT:  NO PALLOR, NO  JVD,  NO   NODES, NECK SUPPLE  CVS: S1 +, S2 +,   RS: AEEB,  OCCASIONAL  RALES +,   NO RONCHI  ABD: SOFT, NT, NO, BS +  EXT: NO PE  SKIN: WARM,   SKELETAL:  ROM REDUCED AT CERVICAL & LS SPINE   CNS:  AAO X 3   , NO  DEFICITS        RADIOLOGY :    B/L LUNG INFILTRATES   < from: Xray Chest 2 Views PA/Lat (12.03.21 @ 17:07) >  IMPRESSION:  Bilateral lower lobe airspace opacities noted suspicious for multifocal pneumonia. Consider atypical viral etiology.    < end of copied text >            ASSESSMENT :     Infection due to severe acute respiratory syndrome coronavirus 2 (SARS-CoV-2)    Hypertension    Diabetes mellitus    Hypothyroidism    History of cholecystectomy    History of appendectomy        PLAN:    HPI:    66 year old female from home, lives alone, has past medical hx of hypertension, DM, hypothyroidism came to ED c/o shortness of breath for 2 days that worsened today associated to dry cough and fatigue. As per patient, generalized weakness started about 4 days ago associated to tiredness, poor appetite and low energy. Patient denies any fever, chills, sick contacts, nausea, vomiting, diarrhea, bleeding, chest pain or palpitations. Of note patient was vaccinated on April with [employer].  (03 Dec 2021 20:55)    - HYPOXIC RESPIRATORY FAILURE DUE TO COVID 19 PNEUMONIA - STARTED ON IV.REMDESVIR AND DECDRON,, & INJ. LOVENOX,  O2 SUPPLEMENTS - NRB . PATIENT IS BEING MONITORED IN ICU   - CT CHEST TO F/UP BY ICU TEAM     - ISOLATION DROPLET     - DM TYPE 2, HYPOTHYROIDISM  - GI AND DVT PROPHYLAXIS    - DR. SUN LEARY             Patient is a 66y old  Female who presents with a chief complaint of AHRF 2/2 COVID19 PNA (04 Dec 2021 00:43)    PATIENT IS SEEN AND EXAMINED IN MEDICAL FLOOR.      ALLERGIES:  No Known Allergies      Daily Height in cm: 162.56 (03 Dec 2021 15:35)    Daily     VITALS:    Vital Signs Last 24 Hrs  T(C): 36.6 (04 Dec 2021 05:00), Max: 37.3 (03 Dec 2021 15:35)  T(F): 97.8 (04 Dec 2021 05:00), Max: 99.1 (03 Dec 2021 15:35)  HR: 91 (04 Dec 2021 06:00) (88 - 105)  BP: 130/58 (04 Dec 2021 06:00) (105/83 - 135/79)  BP(mean): 85 (04 Dec 2021 06:00) (75 - 85)  RR: 48 (04 Dec 2021 06:00) (19 - 48)  SpO2: 100% (04 Dec 2021 06:00) (94% - 100%)    LABS:    CBC Full  -  ( 04 Dec 2021 05:50 )  WBC Count : 6.89 K/uL  RBC Count : 4.33 M/uL  Hemoglobin : 12.6 g/dL  Hematocrit : 38.2 %  Platelet Count - Automated : 239 K/uL  Mean Cell Volume : 88.2 fl  Mean Cell Hemoglobin : 29.1 pg  Mean Cell Hemoglobin Concentration : 33.0 gm/dL  Auto Neutrophil # : 5.79 K/uL  Auto Lymphocyte # : 0.55 K/uL  Auto Monocyte # : 0.34 K/uL  Auto Eosinophil # : 0.00 K/uL  Auto Basophil # : 0.07 K/uL  Auto Neutrophil % : 84.0 %  Auto Lymphocyte % : 8.0 %  Auto Monocyte % : 5.0 %  Auto Eosinophil % : 0.0 %  Auto Basophil % : 1.0 %    PT/INR - ( 04 Dec 2021 05:45 )   PT: 15.0 sec;   INR: 1.28 ratio         PTT - ( 03 Dec 2021 16:21 )  PTT:33.7 sec  12-04    138  |  102  |  15  ----------------------------<  210<H>  4.9   |  22  |  0.80    Ca    9.1      04 Dec 2021 05:50  Phos  4.0     12-04  Mg     2.3     12-04    TPro  7.3  /  Alb  2.5<L>  /  TBili  0.5  /  DBili  x   /  AST  23  /  ALT  22  /  AlkPhos  61  12-04      CAPILLARY BLOOD GLUCOSE    POCT Blood Glucose.: 173 mg/dL (03 Dec 2021 23:45)  POCT Blood Glucose.: 192 mg/dL (03 Dec 2021 15:50)        LIVER FUNCTIONS - ( 04 Dec 2021 05:50 )  Alb: 2.5 g/dL / Pro: 7.3 g/dL / ALK PHOS: 61 U/L / ALT: 22 U/L DA / AST: 23 U/L / GGT: x           Creatinine Trend: 0.80<--, 1.04<--  I&O's Summary    03 Dec 2021 07:01  -  04 Dec 2021 07:00  --------------------------------------------------------  IN: 0 mL / OUT: 550 mL / NET: -550 mL      MEDICATIONS:    MEDICATIONS  (STANDING):  chlorhexidine 2% Cloths 1 Application(s) Topical <User Schedule>  dexAMETHasone  Injectable 6 milliGRAM(s) IV Push daily  enoxaparin Injectable 40 milliGRAM(s) SubCutaneous daily  influenza  Vaccine (HIGH DOSE) 0.7 milliLiter(s) IntraMuscular once  insulin lispro (ADMELOG) corrective regimen sliding scale   SubCutaneous three times a day before meals  insulin lispro (ADMELOG) corrective regimen sliding scale   SubCutaneous at bedtime  levothyroxine 88 MICROGram(s) Oral daily  metoprolol succinate ER 25 milliGRAM(s) Oral daily  pantoprazole    Tablet 40 milliGRAM(s) Oral before breakfast  remdesivir  IVPB   IV Intermittent   remdesivir  IVPB 100 milliGRAM(s) IV Intermittent every 24 hours  senna 2 Tablet(s) Oral at bedtime      MEDICATIONS  (PRN):  acetaminophen     Tablet .. 650 milliGRAM(s) Oral every 6 hours PRN Temp greater or equal to 38C (100.4F), Mild Pain (1 - 3)  ALBUTerol    90 MICROgram(s) HFA Inhaler 2 Puff(s) Inhalation every 6 hours PRN Shortness of Breath and/or Wheezing  guaiFENesin Oral Liquid (Sugar-Free) 200 milliGRAM(s) Oral every 6 hours PRN Cough        REVIEW OF SYSTEMS:                           ALL ROS DONE [ X   ]      CONSTITUTIONAL:  LETHARGIC [   ], FEVER [   ], UNRESPONSIVE [   ]  CVS:  CP  [   ], SOB, [   ], PALPITATIONS [   ], DIZZYNESS [   ]  RS: COUGH [   ], SPUTUM [   ]  GI: ABDOMINAL PAIN [   ], NAUSEA [   ], VOMITINGS [   ], DIARRHEA [   ], CONSTIPATION [   ]  :  DYSURIA [   ], NOCTURIA [   ], INCREASED FREQUENCY [   ], DRIBLING [   ],  SKELETAL: PAINFUL JOINTS [   ], SWOLLEN JOINTS [   ], NECK ACHE [   ], LOW BACK ACHE [   ],  SKIN : ULCERS [   ], RASH [   ], ITCHING [   ]  CNS: HEAD ACHE [   ], DOUBLE VISION [   ], BLURRED VISION [   ], AMS / CONFUSION [   ], SEIZURES [   ], WEAKNESS [   ],TINGLING / NUMBNESS [   ]        PHYSICAL EXAMINATION:    GENERAL APPEARANCE: NO DISTRESS  HEENT:  NO PALLOR, NO  JVD,  NO   NODES, NECK SUPPLE  CVS: S1 +, S2 +,   RS: AEEB,  OCCASIONAL  RALES +,   NO RONCHI  ABD: SOFT, NT, NO, BS +  EXT: NO PE  SKIN: WARM,   SKELETAL:  ROM REDUCED AT CERVICAL & LS SPINE   CNS:  AAO X 3   , NO  DEFICITS        RADIOLOGY :    B/L LUNG INFILTRATES   < from: Xray Chest 2 Views PA/Lat (12.03.21 @ 17:07) >  IMPRESSION:  Bilateral lower lobe airspace opacities noted suspicious for multifocal pneumonia. Consider atypical viral etiology.    < end of copied text >            ASSESSMENT :     Infection due to severe acute respiratory syndrome coronavirus 2 (SARS-CoV-2)    Hypertension    Diabetes mellitus    Hypothyroidism    History of cholecystectomy    History of appendectomy        PLAN:    HPI:    66 year old female from home, lives alone, has past medical hx of hypertension, DM, hypothyroidism came to ED c/o shortness of breath for 2 days that worsened today associated to dry cough and fatigue. As per patient, generalized weakness started about 4 days ago associated to tiredness, poor appetite and low energy. Patient denies any fever, chills, sick contacts, nausea, vomiting, diarrhea, bleeding, chest pain or palpitations. Of note patient was vaccinated on April with [employer]  (03 Dec 2021 20:55)    - HYPOXIC RESPIRATORY FAILURE DUE TO COVID 19 PNEUMONIA - STARTED ON IV.REMDESVIR AND DECDRON,, & INJ. LOVENOX,  O2 SUPPLEMENTS - NRB . PATIENT IS BEING MONITORED IN ICU . PATIENT IS SEEN IMPROVING CLINICALLY  - CT CHEST TO F/UP BY ICU TEAM     - ISOLATION DROPLET     - DM TYPE 2, HYPOTHYROIDISM  - GI AND DVT PROPHYLAXIS    - DR. SUN LEARY

## 2021-12-04 NOTE — PROGRESS NOTE ADULT - ASSESSMENT
66 year old female from home, lives alone, has past medical hx of hypertension, DM, hypothyroidism came to ED c/o shortness of breath for 2 days that worsened today associated to dry cough and fatigue admitted to medicine for AHRF 2/2 COVID PNA, pt vaccinated against covid with J[employer]JX1 in april 2021.  Patient noted to saturating in 70s in ED on RA , was placed on NRB 15liters with improvement to 93%, Patient noted to desaturate to low 90s with minimal exertion , Patient admited to ICU for close monitoring.         Assessment:  AHRF   COVID PNA   DM  hypothyroidism   HTN       Plan:    =====================[CNS ]==============================  # No issues:   - Currently at baseline mental status     =====================[CVS ]===============================  # HTN :    - BP currently acceptable off medication   - monitor BP     =====================[RESP ]==============================  [facility]  [facility] CXR b/l multilobar infiltration, f/u official report   -At time of examination in the ED pt on NRB 15Liters   -Started on Lovenox, Remdesivir, Decadron   - started on Albuterol and ipratropium MDI   -Will send ESR, CRP, Procalcitonin  - continue to trend d-dimer, CRP, LDH, Troponin, Ferritin, CPK  -Tylenol PRN for fever  - Monitor for hemodynamic instability   - Isolation precautions.        =====================[ GI ]================================  #  no issues  - c/w PPi while on steroids     ====================[ RENAL ]=============================   # No issues   - Monitor BMP and electrolytes  - I&Os      =====================[ ID ]================================  #   COVID PNA  - rest as above     ===================[ HEME/ONC ]===========================  # D-dimer elevated 411, likley COVID related   - monitor D-dimer every 3 days   -  [facility] daily       =====================[ ENDO ]=============================  # Patient has hx of DM on glipizide, pioglitazone, alogliptin metformin, dapagliflozin at home  - Holding home po medications  - Started on HSS  - Fingerstick before meals and at bedtime  - DASH diet with consistent carb  - Target -180  - F/u A1c  - target CBG < 180    ================= Skin/Catheters============================  # No active issues   - No rashes.      ==================[ PROPHYLAXIS ]==========================   # Dvt : Lovenox  # Gi : Protonix     ====================[ DISPO ]==============================   - Monitor in ICU     ===================[ PROGNOSIS ]===========================  - Guarded   66 year old female from home, lives alone, has past medical hx of hypertension, DM, hypothyroidism came to ED c/o shortness of breath for 2 days that worsened today associated to dry cough and fatigue admitted to medicine for AHRF 2/2 COVID PNA, pt vaccinated against covid with J&JX1 in april 2021.  Patient noted to saturating in 70s in ED on RA , was placed on NRB 15liters with improvement to 93%, Patient noted to desaturate to low 90s with minimal exertion , Patient admited to ICU for close monitoring.         Assessment:  AHRF   COVID PNA   DM  hypothyroidism   HTN       Plan:    =====================[CNS ]==============================  # No issues:   - Currently at baseline mental status     =====================[CVS ]===============================  # HTN :    - BP currently acceptable off medication   - monitor BP     =====================[RESP ]==============================  [facility]  [facility] CXR b/l multfocal pneumonia  -At time of examination in the ED pt on NRB 15Liters   -Started on Lovenox, Remdesivir, Decadron   - started on Albuterol and ipratropium MDI   -Will send ESR, CRP, Procalcitonin  - continue to trend d-dimer, CRP, LDH, Troponin, Ferritin, CPK  -Tylenol PRN for fever  - Monitor for hemodynamic instability   - Isolation precautions.        =====================[ GI ]================================  #  no issues  - c/w PPi while on steroids     ====================[ RENAL ]=============================   # No issues   - Monitor BMP and electrolytes  - I&Os      =====================[ ID ]================================  #   COVID PNA  - rest as above     ===================[ HEME/ONC ]===========================  # D-dimer elevated 411, likley COVID related   - monitor D-dimer every 3 days   -  CBc daily       =====================[ ENDO ]=============================  # Patient has hx of DM on glipizide, pioglitazone, alogliptin metformin, dapagliflozin at home  - Holding home po medications  - Started on HSS  - Fingerstick before meals and at bedtime  - DASH diet with consistent carb  - Target -180  - F/u A1c  - target CBG < 180    ================= Skin/Catheters============================  # No active issues   - No rashes.      ==================[ PROPHYLAXIS ]==========================   # Dvt : Lovenox  # Gi : Protonix     ====================[ DISPO ]==============================   - Monitor in ICU     ===================[ PROGNOSIS ]===========================  - Guarded

## 2021-12-04 NOTE — CONSULT NOTE ADULT - CONVERSATION DETAILS
An extensive goals of care conversation was held including options, risks and benefits of many medical treatments including CPR, intubation, and tube feeding.  Patient needed more time to think about it. Will remain FULL CODE for now.

## 2021-12-04 NOTE — PATIENT PROFILE ADULT - NSPRESCRUSEDDRG_GEN_A_NUR
Drysol Counseling:  I discussed with the patient the risks of drysol/aluminum chloride including but not limited to skin rash, itching, irritation, burning. No

## 2021-12-04 NOTE — PROGRESS NOTE ADULT - ATTENDING COMMENTS
Assessment:  1. Acute hypoxic respiratory failure    2. Bilateral viral pneumonia   3. COVID19 infection   4. Diabetes Mellitus   5. Hypothyroidism   6. Hypertension     Plan:  - Transitioned to [place] oxygen this morning  - Monitor respiratory status closely   - Remdesivir and IV Dexamethasone   - Oral diet  - Glucose control   - Cont. hypothyroid medications   - Airborne and contact isolation  - Monitor hemodynamics  - DVT prophylaxis

## 2021-12-04 NOTE — CONSULT NOTE ADULT - ASSESSMENT
66 year old female from home, lives alone, has past medical hx of hypertension, DM, hypothyroidism came to ED c/o shortness of breath for 2 days that worsened today associated to dry cough and fatigue admitted to medicine for AHRF 2/2 COVID PNA, pt vaccinated against covid with J&JX1 in april 2021.  Patient noted to saturating in 70s in ED on RA , was placed on NRB 15liters with improvement to 93%, Patient noted to desaturate to low 90s with minimal exertion , Patient admited to ICU for close monitoring.         Assessment:  AHRF   COVID PNA   DM  hypothyroidism   HTN       Plan:    =====================[CNS ]==============================  # No issues:   - Currently at baseline mental status     =====================[CVS ]===============================  # HTN :    - BP currently acceptable off medication   - monitor BP     =====================[RESP ]==============================  [facility]  - CXR b/l multilobar infiltration, f/u official report   - f/u CTA  -At time of examination in the ED pt on NRB 15Liters   -Started on Lovenox, Remdesivir, Decadron   - started on Albuterol and ipratropium MDI   -Will send ESR, CRP, Procalcitonin  - continue to trend d-dimer, CRP, LDH, Troponin, Ferritin, CPK  -Tylenol PRN for fever  - Monitor for hemodynamic instability   - Isolation precautions.        =====================[ GI ]================================  #  no issues  - c/w PPi while on steroids     ====================[ RENAL ]=============================   # No issues   - Monitor BMP and electrolytes  - I&Os      =====================[ ID ]================================  #   COVID PNA  - rest as above     ===================[ HEME/ONC ]===========================  # D-dimer elevated 411, likley COVID related   - monitor D-dimer every 3 days   -  CBc daily       =====================[ ENDO ]=============================  # Patient has hx of DM on glipizide, pioglitazone, alogliptin metformin, dapagliflozin at home  - Holding home po medications  - Started on HSS  - Fingerstick before meals and at bedtime  - DASH diet with consistent carb  - Target -180  - F/u A1c  - target CBG < 180    ================= Skin/Catheters============================  # No active issues   - No rashes.      ==================[ PROPHYLAXIS ]==========================   # Dvt : Lovenox  # Gi : Protonix     ====================[ DISPO ]==============================   - Monitor in ICU     ===================[ PROGNOSIS ]===========================  - Guarded

## 2021-12-04 NOTE — PATIENT PROFILE ADULT - PATIENT'S SEXUAL ORIENTATION
Detail Level: Zone Patient Specific Counseling (Will Not Stick From Patient To Patient): **\\n\\npt would be inconsistent with oracea\\nflared when stopped few weeks ago\\n\\noracea 40mg qd x 3 months\\npt states hx of yeast infxn when starting oral abx, if flares, will call us- can send diflucan\\nmetrolotion too irritating\\nsoolantra cream qd\\n\\nf/u 6 weeks Detail Level: Detailed Heterosexual

## 2021-12-04 NOTE — CONSULT NOTE ADULT - ATTENDING COMMENTS
67 y/o with PMH of DM, HTN and hypothyroidism presented to ER with fatigue, cough, poor appetite and SOB.    A/P hypoxic resp failure   COVID pneumonia  DM   HTN   MICU   NPo for now  Cont oxygen supplementation  Serial ABg, CXR   taper FIo2 as tolerated   Remdesivir, Decadron, Lovenox   daily labs   F/U FS   Cont antihtn

## 2021-12-04 NOTE — PATIENT PROFILE ADULT - FALL HARM RISK - HARM RISK INTERVENTIONS
Assistance with ambulation/Assistance OOB with selected safe patient handling equipment/Communicate Risk of Fall with Harm to all staff/Monitor for mental status changes/Reinforce activity limits and safety measures with patient and family/Reorient to person, place and time as needed/Tailored Fall Risk Interventions/Visual Cue: Yellow wristband and red socks/Bed in lowest position, wheels locked, appropriate side rails in place/Call bell, personal items and telephone in reach/Instruct patient to call for assistance before getting out of bed or chair/Non-slip footwear when patient is out of bed/[place] to call system/Physically safe environment - no spills, clutter or unnecessary equipment/Purposeful Proactive Rounding/Room/bathroom lighting operational, light cord in reach

## 2021-12-05 LAB
ALBUMIN SERPL ELPH-MCNC: 2.3 G/DL — LOW (ref 3.5–5)
ALBUMIN SERPL ELPH-MCNC: 2.3 G/DL — LOW (ref 3.5–5)
ALP SERPL-CCNC: 61 U/L — SIGNIFICANT CHANGE UP (ref 40–120)
ALP SERPL-CCNC: 65 U/L — SIGNIFICANT CHANGE UP (ref 40–120)
ALT FLD-CCNC: 20 U/L DA — SIGNIFICANT CHANGE UP (ref 10–60)
ALT FLD-CCNC: 20 U/L DA — SIGNIFICANT CHANGE UP (ref 10–60)
ANION GAP SERPL CALC-SCNC: 19 MMOL/L — HIGH (ref 5–17)
AST SERPL-CCNC: 13 U/L — SIGNIFICANT CHANGE UP (ref 10–40)
AST SERPL-CCNC: 18 U/L — SIGNIFICANT CHANGE UP (ref 10–40)
BILIRUB DIRECT SERPL-MCNC: <0.1 MG/DL — SIGNIFICANT CHANGE UP (ref 0–0.3)
BILIRUB INDIRECT FLD-MCNC: >0.3 MG/DL — SIGNIFICANT CHANGE UP (ref 0.2–1)
BILIRUB SERPL-MCNC: 0.4 MG/DL — SIGNIFICANT CHANGE UP (ref 0.2–1.2)
BILIRUB SERPL-MCNC: 0.5 MG/DL — SIGNIFICANT CHANGE UP (ref 0.2–1.2)
BUN SERPL-MCNC: 24 MG/DL — HIGH (ref 7–18)
CALCIUM SERPL-MCNC: 9.5 MG/DL — SIGNIFICANT CHANGE UP (ref 8.4–10.5)
CHLORIDE SERPL-SCNC: 107 MMOL/L — SIGNIFICANT CHANGE UP (ref 96–108)
CO2 SERPL-SCNC: 15 MMOL/L — LOW (ref 22–31)
CREAT SERPL-MCNC: 0.85 MG/DL — SIGNIFICANT CHANGE UP (ref 0.5–1.3)
CREAT SERPL-MCNC: 0.93 MG/DL — SIGNIFICANT CHANGE UP (ref 0.5–1.3)
GLUCOSE BLDC GLUCOMTR-MCNC: 159 MG/DL — HIGH (ref 70–99)
GLUCOSE BLDC GLUCOMTR-MCNC: 305 MG/DL — HIGH (ref 70–99)
GLUCOSE BLDC GLUCOMTR-MCNC: 323 MG/DL — HIGH (ref 70–99)
GLUCOSE SERPL-MCNC: 266 MG/DL — HIGH (ref 70–99)
HCT VFR BLD CALC: 39.3 % — SIGNIFICANT CHANGE UP (ref 34.5–45)
HGB BLD-MCNC: 13 G/DL — SIGNIFICANT CHANGE UP (ref 11.5–15.5)
INR BLD: 1.31 RATIO — HIGH (ref 0.88–1.16)
MAGNESIUM SERPL-MCNC: 2.4 MG/DL — SIGNIFICANT CHANGE UP (ref 1.6–2.6)
MCHC RBC-ENTMCNC: 28.9 PG — SIGNIFICANT CHANGE UP (ref 27–34)
MCHC RBC-ENTMCNC: 33.1 GM/DL — SIGNIFICANT CHANGE UP (ref 32–36)
MCV RBC AUTO: 87.3 FL — SIGNIFICANT CHANGE UP (ref 80–100)
NRBC # BLD: 0 /100 WBCS — SIGNIFICANT CHANGE UP (ref 0–0)
PHOSPHATE SERPL-MCNC: 2.8 MG/DL — SIGNIFICANT CHANGE UP (ref 2.5–4.5)
PLATELET # BLD AUTO: 331 K/UL — SIGNIFICANT CHANGE UP (ref 150–400)
POTASSIUM SERPL-MCNC: 4.7 MMOL/L — SIGNIFICANT CHANGE UP (ref 3.5–5.3)
POTASSIUM SERPL-SCNC: 4.7 MMOL/L — SIGNIFICANT CHANGE UP (ref 3.5–5.3)
PROT SERPL-MCNC: 7.7 G/DL — SIGNIFICANT CHANGE UP (ref 6–8.3)
PROT SERPL-MCNC: 8 G/DL — SIGNIFICANT CHANGE UP (ref 6–8.3)
PROTHROM AB SERPL-ACNC: 15.4 SEC — HIGH (ref 10.6–13.6)
RBC # BLD: 4.5 M/UL — SIGNIFICANT CHANGE UP (ref 3.8–5.2)
RBC # FLD: 12.7 % — SIGNIFICANT CHANGE UP (ref 10.3–14.5)
SODIUM SERPL-SCNC: 141 MMOL/L — SIGNIFICANT CHANGE UP (ref 135–145)
WBC # BLD: 8.11 K/UL — SIGNIFICANT CHANGE UP (ref 3.8–10.5)
WBC # FLD AUTO: 8.11 K/UL — SIGNIFICANT CHANGE UP (ref 3.8–10.5)

## 2021-12-05 RX ORDER — POLYETHYLENE GLYCOL 3350 17 G/17G
17 POWDER, FOR SOLUTION ORAL DAILY
Refills: 0 | Status: DISCONTINUED | OUTPATIENT
Start: 2021-12-05 | End: 2021-12-12

## 2021-12-05 RX ORDER — SENNA PLUS 8.6 MG/1
2 TABLET ORAL AT BEDTIME
Refills: 0 | Status: DISCONTINUED | OUTPATIENT
Start: 2021-12-05 | End: 2021-12-12

## 2021-12-05 RX ADMIN — REMDESIVIR 500 MILLIGRAM(S): 5 INJECTION INTRAVENOUS at 22:38

## 2021-12-05 RX ADMIN — Medication 25 MILLIGRAM(S): at 05:18

## 2021-12-05 RX ADMIN — Medication 4: at 17:54

## 2021-12-05 RX ADMIN — POLYETHYLENE GLYCOL 3350 17 GRAM(S): 17 POWDER, FOR SOLUTION ORAL at 17:54

## 2021-12-05 RX ADMIN — Medication 2: at 08:43

## 2021-12-05 RX ADMIN — CHLORHEXIDINE GLUCONATE 1 APPLICATION(S): 213 SOLUTION TOPICAL at 05:19

## 2021-12-05 RX ADMIN — Medication 88 MICROGRAM(S): at 05:18

## 2021-12-05 RX ADMIN — REMDESIVIR 500 MILLIGRAM(S): 5 INJECTION INTRAVENOUS at 01:50

## 2021-12-05 RX ADMIN — Medication 2: at 21:59

## 2021-12-05 RX ADMIN — ENOXAPARIN SODIUM 40 MILLIGRAM(S): 100 INJECTION SUBCUTANEOUS at 12:17

## 2021-12-05 RX ADMIN — SENNA PLUS 2 TABLET(S): 8.6 TABLET ORAL at 21:59

## 2021-12-05 RX ADMIN — Medication 6 MILLIGRAM(S): at 05:18

## 2021-12-05 RX ADMIN — Medication 1: at 12:17

## 2021-12-05 RX ADMIN — PANTOPRAZOLE SODIUM 40 MILLIGRAM(S): 20 TABLET, DELAYED RELEASE ORAL at 05:18

## 2021-12-05 NOTE — PROGRESS NOTE ADULT - SUBJECTIVE AND OBJECTIVE BOX
INTERVAL HPI/OVERNIGHT EVENTS: no acute events overnight     PRESSORS: [ ] YES [ ] NO  WHICH:    ANTIBIOTICS:                  DATE STARTED:  ANTIBIOTICS:                  DATE STARTED:  ANTIBIOTICS:                  DATE STARTED:    Antimicrobial:  remdesivir  IVPB   IV Intermittent   remdesivir  IVPB 100 milliGRAM(s) IV Intermittent every 24 hours    Cardiovascular:  metoprolol succinate ER 25 milliGRAM(s) Oral daily    Pulmonary:  ALBUTerol    90 MICROgram(s) HFA Inhaler 2 Puff(s) Inhalation every 6 hours PRN  guaiFENesin Oral Liquid (Sugar-Free) 200 milliGRAM(s) Oral every 6 hours PRN    Hematalogic:  enoxaparin Injectable 40 milliGRAM(s) SubCutaneous daily    Other:  acetaminophen     Tablet .. 650 milliGRAM(s) Oral every 6 hours PRN  chlorhexidine 2% Cloths 1 Application(s) Topical <User Schedule>  dexAMETHasone  Injectable 6 milliGRAM(s) IV Push daily  influenza  Vaccine (HIGH DOSE) 0.7 milliLiter(s) IntraMuscular once  insulin lispro (ADMELOG) corrective regimen sliding scale   SubCutaneous three times a day before meals  insulin lispro (ADMELOG) corrective regimen sliding scale   SubCutaneous at bedtime  levothyroxine 88 MICROGram(s) Oral daily  pantoprazole    Tablet 40 milliGRAM(s) Oral before breakfast  senna 2 Tablet(s) Oral at bedtime    acetaminophen     Tablet .. 650 milliGRAM(s) Oral every 6 hours PRN  ALBUTerol    90 MICROgram(s) HFA Inhaler 2 Puff(s) Inhalation every 6 hours PRN  chlorhexidine 2% Cloths 1 Application(s) Topical <User Schedule>  dexAMETHasone  Injectable 6 milliGRAM(s) IV Push daily  enoxaparin Injectable 40 milliGRAM(s) SubCutaneous daily  guaiFENesin Oral Liquid (Sugar-Free) 200 milliGRAM(s) Oral every 6 hours PRN  influenza  Vaccine (HIGH DOSE) 0.7 milliLiter(s) IntraMuscular once  insulin lispro (ADMELOG) corrective regimen sliding scale   SubCutaneous three times a day before meals  insulin lispro (ADMELOG) corrective regimen sliding scale   SubCutaneous at bedtime  levothyroxine 88 MICROGram(s) Oral daily  metoprolol succinate ER 25 milliGRAM(s) Oral daily  pantoprazole    Tablet 40 milliGRAM(s) Oral before breakfast  remdesivir  IVPB   IV Intermittent   remdesivir  IVPB 100 milliGRAM(s) IV Intermittent every 24 hours  senna 2 Tablet(s) Oral at bedtime    Drug Dosing Weight  Height (cm): 162.6 (03 Dec 2021 15:35)  Weight (kg): 77.7 (04 Dec 2021 05:00)  BMI (kg/m2): 29.4 (04 Dec 2021 05:00)  BSA (m2): 1.83 (04 Dec 2021 05:00)    CENTRAL LINE: [ ] YES [X ] NO  LOCATION:   DATE INSERTED:  REMOVE: [ ] YES [ ] NO  EXPLAIN:    ALMEIDA: [ ] YES [X ] NO    DATE INSERTED:  REMOVE:  [ ] YES [ ] NO  EXPLAIN:    A-LINE:  [ ] YES [ X] NO  LOCATION:   DATE INSERTED:  REMOVE:  [ ] YES [ ] NO  EXPLAIN    ICU Vital Signs Last 24 Hrs  T(C): 36.1 (04 Dec 2021 20:00), Max: 37.1 (04 Dec 2021 11:00)  T(F): 97 (04 Dec 2021 20:00), Max: 98.7 (04 Dec 2021 11:00)  HR: 84 (04 Dec 2021 22:00) (82 - 92)  BP: 118/59 (05 Dec 2021 00:00) (108/61 - 130/58)  BP(mean): 72 (05 Dec 2021 00:00) (72 - 86)  ABP: --  ABP(mean): --  RR: 32 (04 Dec 2021 22:00) (22 - 48)  SpO2: 92% (04 Dec 2021 22:00) (92% - 100%)             @ 07:01  -   @ 07:00  --------------------------------------------------------  IN: 0 mL / OUT: 550 mL / NET: -550 mL              PHYSICAL EXAM:  GENERAL: NAD, on nc  HEAD:  Atraumatic, Normocephalic  EYES: EOMI, PERRLA, conjunctiva and sclera clear  ENMT: Dry Mucosa  NECK: Supple, No JVD, Normal thyroid  NERVOUS SYSTEM:  Alert & Oriented X3, Good concentration; Motor Strength 5/5 B/L upper and lower extremities; DTRs 2+ intact and symmetric  CHEST/LUNG: bilateral fine crackles   HEART: Regular rate and rhythm; No murmurs, rubs, or gallops  ABDOMEN: Soft, Nontender, Nondistended; Bowel sounds present  EXTREMITIES:  2+ Peripheral Pulses, No clubbing, cyanosis, or edema  LYMPH: No lymphadenopathy noted  SKIN: No rashes or lesions      LABS:  CBC Full  -  ( 04 Dec 2021 05:50 )  WBC Count : 6.89 K/uL  RBC Count : 4.33 M/uL  Hemoglobin : 12.6 g/dL  Hematocrit : 38.2 %  Platelet Count - Automated : 239 K/uL  Mean Cell Volume : 88.2 fl  Mean Cell Hemoglobin : 29.1 pg  Mean Cell Hemoglobin Concentration : 33.0 gm/dL  Auto Neutrophil # : 5.79 K/uL  Auto Lymphocyte # : 0.55 K/uL  Auto Monocyte # : 0.34 K/uL  Auto Eosinophil # : 0.00 K/uL  Auto Basophil # : 0.07 K/uL  Auto Neutrophil % : 84.0 %  Auto Lymphocyte % : 8.0 %  Auto Monocyte % : 5.0 %  Auto Eosinophil % : 0.0 %  Auto Basophil % : 1.0 %    12    138  |  102  |  15  ----------------------------<  210<H>  4.9   |  22  |  0.80    Ca    9.1      04 Dec 2021 05:50  Phos  4.0     12-  Mg     2.3     12-    TPro  7.3  /  Alb  2.5<L>  /  TBili  0.5  /  DBili  x   /  AST  23  /  ALT  22  /  AlkPhos  61  12-    PT/INR - ( 04 Dec 2021 05:45 )   PT: 15.0 sec;   INR: 1.28 ratio         PTT - ( 03 Dec 2021 16:21 )  PTT:33.7 sec  Urinalysis Basic - ( 04 Dec 2021 22:46 )    Color: Yellow / Appearance: Clear / S.020 / pH: x  Gluc: x / Ketone: Large  / Bili: Negative / Urobili: Negative   Blood: x / Protein: 30 mg/dL / Nitrite: Negative   Leuk Esterase: Negative / RBC: 0-2 /HPF / WBC 0-2 /HPF   Sq Epi: x / Non Sq Epi: Occasional /HPF / Bacteria: Moderate /HPF      Culture Results:   No growth to date. ( @ 21:19)  Culture Results:   No growth to date. ( @ 21:19)      RADIOLOGY & ADDITIONAL STUDIES REVIEWED:  ***    [ ]GOALS OF CARE DISCUSSION WITH PATIENT/FAMILY/PROXY:    CRITICAL CARE TIME SPENT: 35 minutes

## 2021-12-05 NOTE — PROGRESS NOTE ADULT - SUBJECTIVE AND OBJECTIVE BOX
Patient is a 66y old  Female who presents with a chief complaint of AHRF 2/2 COVID19 PNA (05 Dec 2021 00:45)    PATIENT IS SEEN AND EXAMINED IN MEDICAL FLOOR.    MARICELT [    ]    JUAN PABLO [   ]      GT [   ]    ALLERGIES:  No Known Allergies      Daily     Daily     VITALS:    Vital Signs Last 24 Hrs  T(C): 36.7 (05 Dec 2021 15:56), Max: 36.7 (05 Dec 2021 15:56)  T(F): 98.1 (05 Dec 2021 15:56), Max: 98.1 (05 Dec 2021 15:56)  HR: 76 (05 Dec 2021 15:56) (74 - 90)  BP: 114/56 (05 Dec 2021 15:56) (114/56 - 126/60)  BP(mean): 59 (05 Dec 2021 07:45) (59 - 86)  RR: 18 (05 Dec 2021 15:56) (18 - 49)  SpO2: 94% (05 Dec 2021 15:56) (91% - 96%)    LABS:    CBC Full  -  ( 05 Dec 2021 06:21 )  WBC Count : 8.11 K/uL  RBC Count : 4.50 M/uL  Hemoglobin : 13.0 g/dL  Hematocrit : 39.3 %  Platelet Count - Automated : 331 K/uL  Mean Cell Volume : 87.3 fl  Mean Cell Hemoglobin : 28.9 pg  Mean Cell Hemoglobin Concentration : 33.1 gm/dL  Auto Neutrophil # : x  Auto Lymphocyte # : x  Auto Monocyte # : x  Auto Eosinophil # : x  Auto Basophil # : x  Auto Neutrophil % : x  Auto Lymphocyte % : x  Auto Monocyte % : x  Auto Eosinophil % : x  Auto Basophil % : x    PT/INR - ( 05 Dec 2021 06:21 )   PT: 15.4 sec;   INR: 1.31 ratio           12-05    141  |  107  |  24<H>  ----------------------------<  266<H>  4.7   |  15<L>  |  0.93    Ca    9.5      05 Dec 2021 06:21  Phos  2.8     12-05  Mg     2.4     12-05    TPro  8.0  /  Alb  2.3<L>  /  TBili  0.5  /  DBili  <0.1  /  AST  18  /  ALT  20  /  AlkPhos  65  12-05    CAPILLARY BLOOD GLUCOSE      POCT Blood Glucose.: 159 mg/dL (05 Dec 2021 11:49)  POCT Blood Glucose.: 252 mg/dL (04 Dec 2021 21:11)  POCT Blood Glucose.: 245 mg/dL (04 Dec 2021 17:58)        LIVER FUNCTIONS - ( 05 Dec 2021 06:21 )  Alb: 2.3 g/dL / Pro: 8.0 g/dL / ALK PHOS: 65 U/L / ALT: 20 U/L DA / AST: 18 U/L / GGT: x           Creatinine Trend: 0.93<--, 0.80<--, 1.04<--  I&O's Summary    04 Dec 2021 07:01  -  05 Dec 2021 07:00  --------------------------------------------------------  IN: 600 mL / OUT: 2000 mL / NET: -1400 mL            .Blood Blood-Peripheral  12-03 @ 21:19   No growth to date.  --  --          MEDICATIONS:    MEDICATIONS  (STANDING):  chlorhexidine 2% Cloths 1 Application(s) Topical <User Schedule>  dexAMETHasone  Injectable 6 milliGRAM(s) IV Push daily  enoxaparin Injectable 40 milliGRAM(s) SubCutaneous daily  influenza  Vaccine (HIGH DOSE) 0.7 milliLiter(s) IntraMuscular once  insulin lispro (ADMELOG) corrective regimen sliding scale   SubCutaneous three times a day before meals  insulin lispro (ADMELOG) corrective regimen sliding scale   SubCutaneous at bedtime  levothyroxine 88 MICROGram(s) Oral daily  metoprolol succinate ER 25 milliGRAM(s) Oral daily  pantoprazole    Tablet 40 milliGRAM(s) Oral before breakfast  polyethylene glycol 3350 17 Gram(s) Oral daily  remdesivir  IVPB   IV Intermittent   remdesivir  IVPB 100 milliGRAM(s) IV Intermittent every 24 hours  senna 2 Tablet(s) Oral at bedtime      MEDICATIONS  (PRN):  acetaminophen     Tablet .. 650 milliGRAM(s) Oral every 6 hours PRN Temp greater or equal to 38C (100.4F), Mild Pain (1 - 3)  ALBUTerol    90 MICROgram(s) HFA Inhaler 2 Puff(s) Inhalation every 6 hours PRN Shortness of Breath and/or Wheezing  guaiFENesin Oral Liquid (Sugar-Free) 200 milliGRAM(s) Oral every 6 hours PRN Cough        REVIEW OF SYSTEMS:                           ALL ROS DONE [ X   ]      CONSTITUTIONAL:  LETHARGIC [   ], FEVER [   ], UNRESPONSIVE [   ]  CVS:  CP  [   ], SOB, [   ], PALPITATIONS [   ], DIZZYNESS [   ]  RS: COUGH [   ], SPUTUM [   ]  GI: ABDOMINAL PAIN [   ], NAUSEA [   ], VOMITINGS [   ], DIARRHEA [   ], CONSTIPATION [   ]  :  DYSURIA [   ], NOCTURIA [   ], INCREASED FREQUENCY [   ], DRIBLING [   ],  SKELETAL: PAINFUL JOINTS [   ], SWOLLEN JOINTS [   ], NECK ACHE [   ], LOW BACK ACHE [   ],  SKIN : ULCERS [   ], RASH [   ], ITCHING [   ]  CNS: HEAD ACHE [   ], DOUBLE VISION [   ], BLURRED VISION [   ], AMS / CONFUSION [   ], SEIZURES [   ], WEAKNESS [   ],TINGLING / NUMBNESS [   ]          PHYSICAL EXAMINATION:    GENERAL APPEARANCE: NO DISTRESS  HEENT:  NO PALLOR, NO  JVD,  NO   NODES, NECK SUPPLE  CVS: S1 +, S2 +,   RS: AEEB,  OCCASIONAL  RALES +,   NO RONCHI  ABD: SOFT, NT, NO, BS +  EXT: PE +  SKIN: WARM,   SKELETAL:  ROM REDUCED AT CERVICAL & LS SPINE   CNS:  AAO X 3   , NO  DEFICITS        RADIOLOGY :    B/L LUNG INFILTRATES   < from: Xray Chest 2 Views PA/Lat (12.03.21 @ 17:07) >  IMPRESSION:  Bilateral lower lobe airspace opacities noted suspicious for multifocal pneumonia. Consider atypical viral etiology.    < end of copied text >            ASSESSMENT :     Infection due to severe acute respiratory syndrome coronavirus 2 (SARS-CoV-2)    Hypertension    Diabetes mellitus    Hypothyroidism    History of cholecystectomy    History of appendectomy        PLAN:    HPI:    66 year old female from home, lives alone, has past medical hx of hypertension, DM, hypothyroidism came to ED c/o shortness of breath for 2 days that worsened today associated to dry cough and fatigue. As per patient, generalized weakness started about 4 days ago associated to tiredness, poor appetite and low energy. Patient denies any fever, chills, sick contacts, nausea, vomiting, diarrhea, bleeding, chest pain or palpitations. Of note patient was vaccinated on April with [employer]  (03 Dec 2021 20:55)      - HYPOXIC RESPIRATORY FAILURE DUE TO COVID 19 PNEUMONIA - STARTED ON IV.REMDESVIR AND DECDRON ( ON 12/03/21 ) ,, & INJ. LOVENOX LOW DOSE ,  O2 SUPPLEMENTS -  . PATIENT WAS MONITORED IN ICU . PATIENT IS SEEN IMPROVING CLINICALLY    - CT CHEST TO F/UP  - AND DECIDE IF NEEDS FULL DOSE ANTICOAGULATION    - DOWN GRADE FROM ICU TEAM  TO FLOOR AFTER IMPROVING HYPOXIC RESPIRATORY FAILURE - TOLERATING WELL ON NASAL CANULA     - ISOLATION DROPLET     - DM TYPE 2, HYPOTHYROIDISM  - GI AND DVT PROPHYLAXIS    - DR. SUN LEARY Patient is a 66y old  Female who presents with a chief complaint of AHRF 2/2 COVID19 PNA (05 Dec 2021 00:45)    PATIENT IS SEEN AND EXAMINED IN MEDICAL FLOOR.    MARICELT [    ]    JUAN PABLO [   ]      GT [   ]    ALLERGIES:  No Known Allergies      Daily     Daily     VITALS:    Vital Signs Last 24 Hrs  T(C): 36.7 (05 Dec 2021 15:56), Max: 36.7 (05 Dec 2021 15:56)  T(F): 98.1 (05 Dec 2021 15:56), Max: 98.1 (05 Dec 2021 15:56)  HR: 76 (05 Dec 2021 15:56) (74 - 90)  BP: 114/56 (05 Dec 2021 15:56) (114/56 - 126/60)  BP(mean): 59 (05 Dec 2021 07:45) (59 - 86)  RR: 18 (05 Dec 2021 15:56) (18 - 49)  SpO2: 94% (05 Dec 2021 15:56) (91% - 96%)    LABS:    CBC Full  -  ( 05 Dec 2021 06:21 )  WBC Count : 8.11 K/uL  RBC Count : 4.50 M/uL  Hemoglobin : 13.0 g/dL  Hematocrit : 39.3 %  Platelet Count - Automated : 331 K/uL  Mean Cell Volume : 87.3 fl  Mean Cell Hemoglobin : 28.9 pg  Mean Cell Hemoglobin Concentration : 33.1 gm/dL  Auto Neutrophil # : x  Auto Lymphocyte # : x  Auto Monocyte # : x  Auto Eosinophil # : x  Auto Basophil # : x  Auto Neutrophil % : x  Auto Lymphocyte % : x  Auto Monocyte % : x  Auto Eosinophil % : x  Auto Basophil % : x    PT/INR - ( 05 Dec 2021 06:21 )   PT: 15.4 sec;   INR: 1.31 ratio           12-05    141  |  107  |  24<H>  ----------------------------<  266<H>  4.7   |  15<L>  |  0.93    Ca    9.5      05 Dec 2021 06:21  Phos  2.8     12-05  Mg     2.4     12-05    TPro  8.0  /  Alb  2.3<L>  /  TBili  0.5  /  DBili  <0.1  /  AST  18  /  ALT  20  /  AlkPhos  65  12-05    CAPILLARY BLOOD GLUCOSE      POCT Blood Glucose.: 159 mg/dL (05 Dec 2021 11:49)  POCT Blood Glucose.: 252 mg/dL (04 Dec 2021 21:11)  POCT Blood Glucose.: 245 mg/dL (04 Dec 2021 17:58)        LIVER FUNCTIONS - ( 05 Dec 2021 06:21 )  Alb: 2.3 g/dL / Pro: 8.0 g/dL / ALK PHOS: 65 U/L / ALT: 20 U/L DA / AST: 18 U/L / GGT: x           Creatinine Trend: 0.93<--, 0.80<--, 1.04<--  I&O's Summary    04 Dec 2021 07:01  -  05 Dec 2021 07:00  --------------------------------------------------------  IN: 600 mL / OUT: 2000 mL / NET: -1400 mL            .Blood Blood-Peripheral  12-03 @ 21:19   No growth to date.  --  --          MEDICATIONS:    MEDICATIONS  (STANDING):  chlorhexidine 2% Cloths 1 Application(s) Topical <User Schedule>  dexAMETHasone  Injectable 6 milliGRAM(s) IV Push daily  enoxaparin Injectable 40 milliGRAM(s) SubCutaneous daily  influenza  Vaccine (HIGH DOSE) 0.7 milliLiter(s) IntraMuscular once  insulin lispro (ADMELOG) corrective regimen sliding scale   SubCutaneous three times a day before meals  insulin lispro (ADMELOG) corrective regimen sliding scale   SubCutaneous at bedtime  levothyroxine 88 MICROGram(s) Oral daily  metoprolol succinate ER 25 milliGRAM(s) Oral daily  pantoprazole    Tablet 40 milliGRAM(s) Oral before breakfast  polyethylene glycol 3350 17 Gram(s) Oral daily  remdesivir  IVPB   IV Intermittent   remdesivir  IVPB 100 milliGRAM(s) IV Intermittent every 24 hours  senna 2 Tablet(s) Oral at bedtime      MEDICATIONS  (PRN):  acetaminophen     Tablet .. 650 milliGRAM(s) Oral every 6 hours PRN Temp greater or equal to 38C (100.4F), Mild Pain (1 - 3)  ALBUTerol    90 MICROgram(s) HFA Inhaler 2 Puff(s) Inhalation every 6 hours PRN Shortness of Breath and/or Wheezing  guaiFENesin Oral Liquid (Sugar-Free) 200 milliGRAM(s) Oral every 6 hours PRN Cough        REVIEW OF SYSTEMS:                           ALL ROS DONE [ X   ]      CONSTITUTIONAL:  LETHARGIC [   ], FEVER [   ], UNRESPONSIVE [   ]  CVS:  CP  [   ], SOB, [   ], PALPITATIONS [   ], DIZZYNESS [   ]  RS: COUGH [   ], SPUTUM [   ]  GI: ABDOMINAL PAIN [   ], NAUSEA [   ], VOMITINGS [   ], DIARRHEA [   ], CONSTIPATION [   ]  :  DYSURIA [   ], NOCTURIA [   ], INCREASED FREQUENCY [   ], DRIBLING [   ],  SKELETAL: PAINFUL JOINTS [   ], SWOLLEN JOINTS [   ], NECK ACHE [   ], LOW BACK ACHE [   ],  SKIN : ULCERS [   ], RASH [   ], ITCHING [   ]  CNS: HEAD ACHE [   ], DOUBLE VISION [   ], BLURRED VISION [   ], AMS / CONFUSION [   ], SEIZURES [   ], WEAKNESS [   ],TINGLING / NUMBNESS [   ]          PHYSICAL EXAMINATION:    GENERAL APPEARANCE: NO DISTRESS  HEENT:  NO PALLOR, NO  JVD,  NO   NODES, NECK SUPPLE  CVS: S1 +, S2 +,   RS: AEEB,  OCCASIONAL  RALES +,   NO RONCHI  ABD: SOFT, NT, NO, BS +  EXT: PE +  SKIN: WARM,   SKELETAL:  ROM REDUCED AT CERVICAL & LS SPINE   CNS:  AAO X 3   , NO  DEFICITS        RADIOLOGY :    B/L LUNG INFILTRATES   < from: Xray Chest 2 Views PA/Lat (12.03.21 @ 17:07) >  IMPRESSION:  Bilateral lower lobe airspace opacities noted suspicious for multifocal pneumonia. Consider atypical viral etiology.    < end of copied text >            ASSESSMENT :     Infection due to severe acute respiratory syndrome coronavirus 2 (SARS-CoV-2)    Hypertension    Diabetes mellitus    Hypothyroidism    History of cholecystectomy    History of appendectomy        PLAN:    HPI:    66 year old female from home, lives alone, has past medical hx of hypertension, DM, hypothyroidism came to ED c/o shortness of breath for 2 days that worsened today associated to dry cough and fatigue. As per patient, generalized weakness started about 4 days ago associated to tiredness, poor appetite and low energy. Patient denies any fever, chills, sick contacts, nausea, vomiting, diarrhea, bleeding, chest pain or palpitations. Of note patient was vaccinated on April with [employer]  (03 Dec 2021 20:55)      - HYPOXIC RESPIRATORY FAILURE DUE TO COVID 19 PNEUMONIA - STARTED ON IV.REMDESVIR AND DECDRON ( ON 12/03/21 ) ,, & INJ. LOVENOX LOW DOSE ,  O2 SUPPLEMENTS -  . PATIENT WAS MONITORED IN ICU . PATIENT IS SEEN IMPROVING CLINICALLY ON CURRENT MANAGEMENT     - CT CHEST TO F/UP ( NOT DONE YET )  - AND DECIDE IF NEEDS FULL DOSE ANTICOAGULATION    - DOWN GRADE FROM ICU TEAM  TO FLOOR AFTER IMPROVING HYPOXIC RESPIRATORY FAILURE - TOLERATING WELL ON NASAL CANULA     - ISOLATION DROPLET     - DM TYPE 2, HYPOTHYROIDISM, OBESITY   - GI AND DVT PROPHYLAXIS    - DR. SUN LEARY

## 2021-12-05 NOTE — CHART NOTE - NSCHARTNOTEFT_GEN_A_CORE
66 year old female from home, lives alone, has past medical hx of hypertension, DM, hypothyroidism came to ED with complains of shortness of breath for 2 days that worsened today associated with dry cough and fatigue. As per patient, generalized weakness started about 4 days ago associated to tiredness, poor appetite and low energy. Patient denies any fever, chills, sick contacts, nausea, vomiting, diarrhea, bleeding, chest pain or palpitations. Of note patient was vaccinated on April with [employer]  (03 Dec 2021 20:55)    ED course : Patient noted to saturating in 70s in ED on RA , was placed on NRB 15liters with improvement to 93%, Patient noted to desaturate  with minimal exertions to low 90s with wob , ICU consulted for close monitoring. Patient was admitted for ICU for closer monitoring.      Patient was started on Decadron and Remdesivir. Her O2 saturations improved. she was transitioned to nasal cannula. Patient noted to be saturating above 90%.          Things to follow:    -monitor finger sticks and adjust sliding scale as needed.  -monitor O2 saturation.      Patient is stable for downgrade to medicine service, under Dr Plunkett service, Signed out to Dr Sparks, PGY-1.

## 2021-12-06 LAB
A1C WITH ESTIMATED AVERAGE GLUCOSE RESULT: 9 % — HIGH (ref 4–5.6)
ALBUMIN SERPL ELPH-MCNC: 2.2 G/DL — LOW (ref 3.5–5)
ALP SERPL-CCNC: 59 U/L — SIGNIFICANT CHANGE UP (ref 40–120)
ALT FLD-CCNC: 16 U/L DA — SIGNIFICANT CHANGE UP (ref 10–60)
ANION GAP SERPL CALC-SCNC: 9 MMOL/L — SIGNIFICANT CHANGE UP (ref 5–17)
AST SERPL-CCNC: 10 U/L — SIGNIFICANT CHANGE UP (ref 10–40)
BILIRUB SERPL-MCNC: 0.4 MG/DL — SIGNIFICANT CHANGE UP (ref 0.2–1.2)
BUN SERPL-MCNC: 26 MG/DL — HIGH (ref 7–18)
CALCIUM SERPL-MCNC: 9.8 MG/DL — SIGNIFICANT CHANGE UP (ref 8.4–10.5)
CHLORIDE SERPL-SCNC: 104 MMOL/L — SIGNIFICANT CHANGE UP (ref 96–108)
CO2 SERPL-SCNC: 26 MMOL/L — SIGNIFICANT CHANGE UP (ref 22–31)
CREAT SERPL-MCNC: 0.98 MG/DL — SIGNIFICANT CHANGE UP (ref 0.5–1.3)
CULTURE RESULTS: SIGNIFICANT CHANGE UP
ESTIMATED AVERAGE GLUCOSE: 212 MG/DL — HIGH (ref 68–114)
GLUCOSE BLDC GLUCOMTR-MCNC: 312 MG/DL — HIGH (ref 70–99)
GLUCOSE BLDC GLUCOMTR-MCNC: 329 MG/DL — HIGH (ref 70–99)
GLUCOSE BLDC GLUCOMTR-MCNC: 331 MG/DL — HIGH (ref 70–99)
GLUCOSE BLDC GLUCOMTR-MCNC: 351 MG/DL — HIGH (ref 70–99)
GLUCOSE BLDC GLUCOMTR-MCNC: 355 MG/DL — HIGH (ref 70–99)
GLUCOSE BLDC GLUCOMTR-MCNC: 360 MG/DL — HIGH (ref 70–99)
GLUCOSE SERPL-MCNC: 353 MG/DL — HIGH (ref 70–99)
HCT VFR BLD CALC: 38.1 % — SIGNIFICANT CHANGE UP (ref 34.5–45)
HGB BLD-MCNC: 12.7 G/DL — SIGNIFICANT CHANGE UP (ref 11.5–15.5)
INR BLD: 1.23 RATIO — HIGH (ref 0.88–1.16)
MAGNESIUM SERPL-MCNC: 2.5 MG/DL — SIGNIFICANT CHANGE UP (ref 1.6–2.6)
MCHC RBC-ENTMCNC: 29.5 PG — SIGNIFICANT CHANGE UP (ref 27–34)
MCHC RBC-ENTMCNC: 33.3 GM/DL — SIGNIFICANT CHANGE UP (ref 32–36)
MCV RBC AUTO: 88.4 FL — SIGNIFICANT CHANGE UP (ref 80–100)
NRBC # BLD: 0 /100 WBCS — SIGNIFICANT CHANGE UP (ref 0–0)
PHOSPHATE SERPL-MCNC: 2.2 MG/DL — LOW (ref 2.5–4.5)
PLATELET # BLD AUTO: 344 K/UL — SIGNIFICANT CHANGE UP (ref 150–400)
POTASSIUM SERPL-MCNC: 4.2 MMOL/L — SIGNIFICANT CHANGE UP (ref 3.5–5.3)
POTASSIUM SERPL-SCNC: 4.2 MMOL/L — SIGNIFICANT CHANGE UP (ref 3.5–5.3)
PROT SERPL-MCNC: 7.2 G/DL — SIGNIFICANT CHANGE UP (ref 6–8.3)
PROTHROM AB SERPL-ACNC: 14.5 SEC — HIGH (ref 10.6–13.6)
RBC # BLD: 4.31 M/UL — SIGNIFICANT CHANGE UP (ref 3.8–5.2)
RBC # FLD: 12.9 % — SIGNIFICANT CHANGE UP (ref 10.3–14.5)
SODIUM SERPL-SCNC: 139 MMOL/L — SIGNIFICANT CHANGE UP (ref 135–145)
SPECIMEN SOURCE: SIGNIFICANT CHANGE UP
WBC # BLD: 7.59 K/UL — SIGNIFICANT CHANGE UP (ref 3.8–10.5)
WBC # FLD AUTO: 7.59 K/UL — SIGNIFICANT CHANGE UP (ref 3.8–10.5)

## 2021-12-06 PROCEDURE — 71275 CT ANGIOGRAPHY CHEST: CPT | Mod: 26

## 2021-12-06 RX ORDER — DEXTROSE 50 % IN WATER 50 %
12.5 SYRINGE (ML) INTRAVENOUS ONCE
Refills: 0 | Status: DISCONTINUED | OUTPATIENT
Start: 2021-12-06 | End: 2021-12-07

## 2021-12-06 RX ORDER — INSULIN GLARGINE 100 [IU]/ML
10 INJECTION, SOLUTION SUBCUTANEOUS EVERY MORNING
Refills: 0 | Status: DISCONTINUED | OUTPATIENT
Start: 2021-12-06 | End: 2021-12-07

## 2021-12-06 RX ORDER — BENZOCAINE AND MENTHOL 5; 1 G/100ML; G/100ML
1 LIQUID ORAL EVERY 4 HOURS
Refills: 0 | Status: DISCONTINUED | OUTPATIENT
Start: 2021-12-06 | End: 2021-12-12

## 2021-12-06 RX ORDER — DEXTROSE 50 % IN WATER 50 %
25 SYRINGE (ML) INTRAVENOUS ONCE
Refills: 0 | Status: DISCONTINUED | OUTPATIENT
Start: 2021-12-06 | End: 2021-12-07

## 2021-12-06 RX ORDER — SODIUM CHLORIDE 0.65 %
1 AEROSOL, SPRAY (ML) NASAL
Refills: 0 | Status: DISCONTINUED | OUTPATIENT
Start: 2021-12-06 | End: 2021-12-12

## 2021-12-06 RX ORDER — DEXTROSE 50 % IN WATER 50 %
15 SYRINGE (ML) INTRAVENOUS ONCE
Refills: 0 | Status: DISCONTINUED | OUTPATIENT
Start: 2021-12-06 | End: 2021-12-07

## 2021-12-06 RX ORDER — GLUCAGON INJECTION, SOLUTION 0.5 MG/.1ML
1 INJECTION, SOLUTION SUBCUTANEOUS ONCE
Refills: 0 | Status: DISCONTINUED | OUTPATIENT
Start: 2021-12-06 | End: 2021-12-12

## 2021-12-06 RX ADMIN — Medication 6 MILLIGRAM(S): at 06:38

## 2021-12-06 RX ADMIN — INSULIN GLARGINE 10 UNIT(S): 100 INJECTION, SOLUTION SUBCUTANEOUS at 08:45

## 2021-12-06 RX ADMIN — Medication 25 MILLIGRAM(S): at 06:38

## 2021-12-06 RX ADMIN — CHLORHEXIDINE GLUCONATE 1 APPLICATION(S): 213 SOLUTION TOPICAL at 06:39

## 2021-12-06 RX ADMIN — Medication 5: at 12:34

## 2021-12-06 RX ADMIN — Medication 2: at 22:16

## 2021-12-06 RX ADMIN — POLYETHYLENE GLYCOL 3350 17 GRAM(S): 17 POWDER, FOR SOLUTION ORAL at 12:24

## 2021-12-06 RX ADMIN — SENNA PLUS 2 TABLET(S): 8.6 TABLET ORAL at 22:43

## 2021-12-06 RX ADMIN — Medication 5: at 08:09

## 2021-12-06 RX ADMIN — Medication 5: at 16:30

## 2021-12-06 RX ADMIN — Medication 88 MICROGRAM(S): at 06:38

## 2021-12-06 RX ADMIN — BENZOCAINE AND MENTHOL 1 LOZENGE: 5; 1 LIQUID ORAL at 02:28

## 2021-12-06 RX ADMIN — PANTOPRAZOLE SODIUM 40 MILLIGRAM(S): 20 TABLET, DELAYED RELEASE ORAL at 06:38

## 2021-12-06 RX ADMIN — ENOXAPARIN SODIUM 40 MILLIGRAM(S): 100 INJECTION SUBCUTANEOUS at 12:24

## 2021-12-06 RX ADMIN — REMDESIVIR 500 MILLIGRAM(S): 5 INJECTION INTRAVENOUS at 22:43

## 2021-12-06 NOTE — PROGRESS NOTE ADULT - SUBJECTIVE AND OBJECTIVE BOX
Patient is a 66y old  Female who presents with a chief complaint of AHRF 2/2 COVID19 PNA (05 Dec 2021 16:35)    PATIENT IS SEEN AND EXAMINED IN MEDICAL FLOOR.  NGT [    ]    JUAN PABLO [   ]      GT [   ]    ALLERGIES:  No Known Allergies      Daily     Daily Weight in k.1 (06 Dec 2021 04:52)    VITALS:    Vital Signs Last 24 Hrs  T(C): 36 (06 Dec 2021 07:34), Max: 36.7 (05 Dec 2021 15:56)  T(F): 96.8 (06 Dec 2021 07:34), Max: 98.1 (05 Dec 2021 15:56)  HR: 75 (06 Dec 2021 07:34) (75 - 87)  BP: 115/57 (06 Dec 2021 07:34) (112/62 - 128/62)  BP(mean): --  RR: 18 (06 Dec 2021 07:34) (18 - 18)  SpO2: 92% (06 Dec 2021 07:34) (92% - 99%)    LABS:    CBC Full  -  ( 06 Dec 2021 06:45 )  WBC Count : 7.59 K/uL  RBC Count : 4.31 M/uL  Hemoglobin : 12.7 g/dL  Hematocrit : 38.1 %  Platelet Count - Automated : 344 K/uL  Mean Cell Volume : 88.4 fl  Mean Cell Hemoglobin : 29.5 pg  Mean Cell Hemoglobin Concentration : 33.3 gm/dL  Auto Neutrophil # : x  Auto Lymphocyte # : x  Auto Monocyte # : x  Auto Eosinophil # : x  Auto Basophil # : x  Auto Neutrophil % : x  Auto Lymphocyte % : x  Auto Monocyte % : x  Auto Eosinophil % : x  Auto Basophil % : x    PT/INR - ( 06 Dec 2021 06:45 )   PT: 14.5 sec;   INR: 1.23 ratio           12    139  |  104  |  26<H>  ----------------------------<  353<H>  4.2   |  26  |  0.98    Ca    9.8      06 Dec 2021 06:45  Phos  2.2     12-  Mg     2.5     12-    TPro  7.2  /  Alb  2.2<L>  /  TBili  0.4  /  DBili  x   /  AST  10  /  ALT  16  /  AlkPhos  59  12-    CAPILLARY BLOOD GLUCOSE      POCT Blood Glucose.: 355 mg/dL (06 Dec 2021 07:59)  POCT Blood Glucose.: 305 mg/dL (05 Dec 2021 21:20)  POCT Blood Glucose.: 323 mg/dL (05 Dec 2021 17:23)  POCT Blood Glucose.: 159 mg/dL (05 Dec 2021 11:49)        LIVER FUNCTIONS - ( 06 Dec 2021 06:45 )  Alb: 2.2 g/dL / Pro: 7.2 g/dL / ALK PHOS: 59 U/L / ALT: 16 U/L DA / AST: 10 U/L / GGT: x           Creatinine Trend: 0.98<--, 0.93<--, 0.80<--, 1.04<--  I&O's Summary          Clean Catch Clean Catch (Midstream)   @ 03:35   <10,000 CFU/mL Normal Urogenital Juju  --  --      .Blood Blood-Peripheral   @ 21:19   No growth to date.  --  --          MEDICATIONS:    MEDICATIONS  (STANDING):  chlorhexidine 2% Cloths 1 Application(s) Topical <User Schedule>  dexAMETHasone  Injectable 6 milliGRAM(s) IV Push daily  enoxaparin Injectable 40 milliGRAM(s) SubCutaneous daily  influenza  Vaccine (HIGH DOSE) 0.7 milliLiter(s) IntraMuscular once  insulin lispro (ADMELOG) corrective regimen sliding scale   SubCutaneous three times a day before meals  insulin lispro (ADMELOG) corrective regimen sliding scale   SubCutaneous at bedtime  levothyroxine 88 MICROGram(s) Oral daily  metoprolol succinate ER 25 milliGRAM(s) Oral daily  pantoprazole    Tablet 40 milliGRAM(s) Oral before breakfast  polyethylene glycol 3350 17 Gram(s) Oral daily  remdesivir  IVPB   IV Intermittent   remdesivir  IVPB 100 milliGRAM(s) IV Intermittent every 24 hours  senna 2 Tablet(s) Oral at bedtime      MEDICATIONS  (PRN):  acetaminophen     Tablet .. 650 milliGRAM(s) Oral every 6 hours PRN Temp greater or equal to 38C (100.4F), Mild Pain (1 - 3)  ALBUTerol    90 MICROgram(s) HFA Inhaler 2 Puff(s) Inhalation every 6 hours PRN Shortness of Breath and/or Wheezing  benzocaine 15 mG/menthol 3.6 mG (Sugar-Free) Lozenge 1 Lozenge Oral every 4 hours PRN Sore Throat  guaiFENesin Oral Liquid (Sugar-Free) 200 milliGRAM(s) Oral every 6 hours PRN Cough  sodium chloride 0.65% Nasal 1 Spray(s) Both Nostrils four times a day PRN Nasal Congestion      REVIEW OF SYSTEMS:                           ALL ROS DONE [ X   ]    CONSTITUTIONAL:  LETHARGIC [   ], FEVER [   ], UNRESPONSIVE [   ]  CVS:  CP  [   ], SOB, [   ], PALPITATIONS [   ], DIZZYNESS [   ]  RS: COUGH [   ], SPUTUM [   ]  GI: ABDOMINAL PAIN [   ], NAUSEA [   ], VOMITINGS [   ], DIARRHEA [   ], CONSTIPATION [   ]  :  DYSURIA [   ], NOCTURIA [   ], INCREASED FREQUENCY [   ], DRIBLING [   ],  SKELETAL: PAINFUL JOINTS [   ], SWOLLEN JOINTS [   ], NECK ACHE [   ], LOW BACK ACHE [   ],  SKIN : ULCERS [   ], RASH [   ], ITCHING [   ]  CNS: HEAD ACHE [   ], DOUBLE VISION [   ], BLURRED VISION [   ], AMS / CONFUSION [   ], SEIZURES [   ], WEAKNESS [   ],TINGLING / NUMBNESS [   ]      PHYSICAL EXAMINATION:    GENERAL APPEARANCE: NO DISTRESS  HEENT:  NO PALLOR, NO  JVD,  NO   NODES, NECK SUPPLE  CVS: S1 +, S2 +,   RS: AEEB,  OCCASIONAL  RALES +,   NO RONCHI  ABD: SOFT, NT, NO, BS +  EXT: PE +  SKIN: WARM,   SKELETAL:  ROM REDUCED AT CERVICAL & LS SPINE   CNS:  AAO X 3   , NO  DEFICITS        RADIOLOGY :    B/L LUNG INFILTRATES   < from: Xray Chest 2 Views PA/Lat (21 @ 17:07) >  IMPRESSION:  Bilateral lower lobe airspace opacities noted suspicious for multifocal pneumonia. Consider atypical viral etiology.    < end of copied text >      ASSESSMENT :     Infection due to severe acute respiratory syndrome coronavirus 2 (SARS-CoV-2)    Hypertension    Diabetes mellitus    Hypothyroidism    History of cholecystectomy    History of appendectomy        PLAN:    HPI:    66 year old female from home, lives alone, has past medical hx of hypertension, DM, hypothyroidism came to ED c/o shortness of breath for 2 days that worsened today associated to dry cough and fatigue. As per patient, generalized weakness started about 4 days ago associated to tiredness, poor appetite and low energy. Patient denies any fever, chills, sick contacts, nausea, vomiting, diarrhea, bleeding, chest pain or palpitations. Of note patient was vaccinated on April with [employer]  (03 Dec 2021 20:55)      - HYPOXIC RESPIRATORY FAILURE DUE TO COVID 19 PNEUMONIA - STARTED ON IV.REMDESVIR AND DECDRON ( ON 21 ) ,, & INJ. LOVENOX LOW DOSE ,  O2 SUPPLEMENTS -  . PATIENT WAS MONITORED IN ICU . PATIENT IS SEEN IMPROVING CLINICALLY ON CURRENT MANAGEMENT     - CT CHEST TO F/UP ( NOT DONE YET )  - AND DECIDE IF NEEDS FULL DOSE ANTICOAGULATION    - DOWN GRADE FROM ICU TEAM  TO FLOOR AFTER IMPROVING HYPOXIC RESPIRATORY FAILURE - TOLERATING WELL ON NASAL CANULA     - ISOLATION DROPLET     - DM TYPE 2, HYPOTHYROIDISM, OBESITY   - GI AND DVT PROPHYLAXIS   Patient is a 66y old  Female who presents with a chief complaint of AHRF 2/2 COVID19 PNA (05 Dec 2021 16:35)    PATIENT IS SEEN AND EXAMINED IN MEDICAL FLOOR.  NGT [    ]    JUAN PABLO [   ]      GT [   ]    ALLERGIES:  No Known Allergies      Daily     Daily Weight in k.1 (06 Dec 2021 04:52)    VITALS:    Vital Signs Last 24 Hrs  T(C): 36 (06 Dec 2021 07:34), Max: 36.7 (05 Dec 2021 15:56)  T(F): 96.8 (06 Dec 2021 07:34), Max: 98.1 (05 Dec 2021 15:56)  HR: 75 (06 Dec 2021 07:34) (75 - 87)  BP: 115/57 (06 Dec 2021 07:34) (112/62 - 128/62)  BP(mean): --  RR: 18 (06 Dec 2021 07:34) (18 - 18)  SpO2: 92% (06 Dec 2021 07:34) (92% - 99%)    LABS:    CBC Full  -  ( 06 Dec 2021 06:45 )  WBC Count : 7.59 K/uL  RBC Count : 4.31 M/uL  Hemoglobin : 12.7 g/dL  Hematocrit : 38.1 %  Platelet Count - Automated : 344 K/uL  Mean Cell Volume : 88.4 fl  Mean Cell Hemoglobin : 29.5 pg  Mean Cell Hemoglobin Concentration : 33.3 gm/dL  Auto Neutrophil # : x  Auto Lymphocyte # : x  Auto Monocyte # : x  Auto Eosinophil # : x  Auto Basophil # : x  Auto Neutrophil % : x  Auto Lymphocyte % : x  Auto Monocyte % : x  Auto Eosinophil % : x  Auto Basophil % : x    PT/INR - ( 06 Dec 2021 06:45 )   PT: 14.5 sec;   INR: 1.23 ratio           12    139  |  104  |  26<H>  ----------------------------<  353<H>  4.2   |  26  |  0.98    Ca    9.8      06 Dec 2021 06:45  Phos  2.2     12-  Mg     2.5     12-    TPro  7.2  /  Alb  2.2<L>  /  TBili  0.4  /  DBili  x   /  AST  10  /  ALT  16  /  AlkPhos  59  12-    CAPILLARY BLOOD GLUCOSE      POCT Blood Glucose.: 355 mg/dL (06 Dec 2021 07:59)  POCT Blood Glucose.: 305 mg/dL (05 Dec 2021 21:20)  POCT Blood Glucose.: 323 mg/dL (05 Dec 2021 17:23)  POCT Blood Glucose.: 159 mg/dL (05 Dec 2021 11:49)        LIVER FUNCTIONS - ( 06 Dec 2021 06:45 )  Alb: 2.2 g/dL / Pro: 7.2 g/dL / ALK PHOS: 59 U/L / ALT: 16 U/L DA / AST: 10 U/L / GGT: x           Creatinine Trend: 0.98<--, 0.93<--, 0.80<--, 1.04<--  I&O's Summary          Clean Catch Clean Catch (Midstream)   @ 03:35   <10,000 CFU/mL Normal Urogenital Juju  --  --      .Blood Blood-Peripheral   @ 21:19   No growth to date.  --  --          MEDICATIONS:    MEDICATIONS  (STANDING):  chlorhexidine 2% Cloths 1 Application(s) Topical <User Schedule>  dexAMETHasone  Injectable 6 milliGRAM(s) IV Push daily  enoxaparin Injectable 40 milliGRAM(s) SubCutaneous daily  influenza  Vaccine (HIGH DOSE) 0.7 milliLiter(s) IntraMuscular once  insulin lispro (ADMELOG) corrective regimen sliding scale   SubCutaneous three times a day before meals  insulin lispro (ADMELOG) corrective regimen sliding scale   SubCutaneous at bedtime  levothyroxine 88 MICROGram(s) Oral daily  metoprolol succinate ER 25 milliGRAM(s) Oral daily  pantoprazole    Tablet 40 milliGRAM(s) Oral before breakfast  polyethylene glycol 3350 17 Gram(s) Oral daily  remdesivir  IVPB   IV Intermittent   remdesivir  IVPB 100 milliGRAM(s) IV Intermittent every 24 hours  senna 2 Tablet(s) Oral at bedtime      MEDICATIONS  (PRN):  acetaminophen     Tablet .. 650 milliGRAM(s) Oral every 6 hours PRN Temp greater or equal to 38C (100.4F), Mild Pain (1 - 3)  ALBUTerol    90 MICROgram(s) HFA Inhaler 2 Puff(s) Inhalation every 6 hours PRN Shortness of Breath and/or Wheezing  benzocaine 15 mG/menthol 3.6 mG (Sugar-Free) Lozenge 1 Lozenge Oral every 4 hours PRN Sore Throat  guaiFENesin Oral Liquid (Sugar-Free) 200 milliGRAM(s) Oral every 6 hours PRN Cough  sodium chloride 0.65% Nasal 1 Spray(s) Both Nostrils four times a day PRN Nasal Congestion      REVIEW OF SYSTEMS:                           ALL ROS DONE [ X   ]    CONSTITUTIONAL:  LETHARGIC [   ], FEVER [   ], UNRESPONSIVE [   ]  CVS:  CP  [   ], SOB, [   ], PALPITATIONS [   ], DIZZYNESS [   ]  RS: COUGH [   ], SPUTUM [   ]  GI: ABDOMINAL PAIN [   ], NAUSEA [   ], VOMITINGS [   ], DIARRHEA [   ], CONSTIPATION [   ]  :  DYSURIA [   ], NOCTURIA [   ], INCREASED FREQUENCY [   ], DRIBLING [   ],  SKELETAL: PAINFUL JOINTS [   ], SWOLLEN JOINTS [   ], NECK ACHE [   ], LOW BACK ACHE [   ],  SKIN : ULCERS [   ], RASH [   ], ITCHING [   ]  CNS: HEAD ACHE [   ], DOUBLE VISION [   ], BLURRED VISION [   ], AMS / CONFUSION [   ], SEIZURES [   ], WEAKNESS [   ],TINGLING / NUMBNESS [   ]      PHYSICAL EXAMINATION:    GENERAL APPEARANCE: NO DISTRESS  HEENT:  NO PALLOR, NO  JVD,  NO   NODES, NECK SUPPLE  CVS: S1 +, S2 +,   RS: AEEB,  OCCASIONAL  RALES +,   NO RONCHI  ABD: SOFT, NT, NO, BS +  EXT: PE +  SKIN: WARM,   SKELETAL:  ROM REDUCED AT CERVICAL & LS SPINE   CNS:  AAO X 3   , NO  DEFICITS        RADIOLOGY :    B/L LUNG INFILTRATES   < from: Xray Chest 2 Views PA/Lat (21 @ 17:07) >  IMPRESSION:  Bilateral lower lobe airspace opacities noted suspicious for multifocal pneumonia. Consider atypical viral etiology.    < end of copied text >      ASSESSMENT :     Infection due to severe acute respiratory syndrome coronavirus 2 (SARS-CoV-2)    Hypertension    Diabetes mellitus    Hypothyroidism    History of cholecystectomy    History of appendectomy        PLAN:    HPI:    66 year old female from home, lives alone, has past medical hx of hypertension, DM, hypothyroidism came to ED c/o shortness of breath for 2 days that worsened today associated to dry cough and fatigue. As per patient, generalized weakness started about 4 days ago associated to tiredness, poor appetite and low energy. Patient denies any fever, chills, sick contacts, nausea, vomiting, diarrhea, bleeding, chest pain or palpitations. Of note patient was vaccinated on April with [employer]  (03 Dec 2021 20:55)      - HYPOXIC RESPIRATORY FAILURE DUE TO COVID 19 PNEUMONIA - STARTED ON IV.REMDESVIR AND DECDRON ( ON 21 ) ,, & INJ. LOVENOX LOW DOSE ,  O2 SUPPLEMENTS -  . PATIENT WAS MONITORED IN ICU . PATIENT IS SEEN IMPROVING CLINICALLY ON CURRENT MANAGEMENT   -  CTA CHEST NEGATIVE FOR PE, NOTED TO HAVE INFILTRATE AND CHANGES SUSPICIOUS FOR PULM HTN  - PULMONARY CONSULT AND CARDIOLOGY CONSULT - F/U ECHOCARDIOGRAM TO EVALUATE FOR PULMONARY HYPERTENSION    - DOWN GRADE FROM ICU TEAM  TO FLOOR AFTER IMPROVING HYPOXIC RESPIRATORY FAILURE - TOLERATING WELL ON NASAL CANULA     - ISOLATION DROPLET     - DM TYPE 2, HYPOTHYROIDISM, OBESITY   - GI AND DVT PROPHYLAXIS

## 2021-12-06 NOTE — PROGRESS NOTE ADULT - ASSESSMENT
66 year old female from home, lives alone, has past medical hx of hypertension, DM, hypothyroidism came to ED c/o shortness of breath for 2 days that worsened today associated to dry cough and fatigue admitted to medicine for AHRF 2/2 COVID PNA, pt vaccinated against covid with [employer] in april 2021.  Patient noted to saturating in 70s in ED on RA , was placed on NRB 15liters with improvement to 93%, Patient noted to desaturate to low 90s with minimal exertion. Patient admitted to ICU for COVID PNA and close monitoring. Stable for DG to medical floor

## 2021-12-06 NOTE — PROGRESS NOTE ADULT - SUBJECTIVE AND OBJECTIVE BOX
PGY-1 Progress Note discussed with attending    PAGER #: [[phone]] TILL 5:00 PM  PLEASE CONTACT ON CALL TEAM:  - On Call Team (Please refer to Edna) FROM 5:00 PM - 8:30PM  - Nightfloat Team FROM 8:30 -7:30 AM    CHIEF COMPLAINT & BRIEF HOSPITAL COURSE:  6 year old female from home, lives alone, has past medical hx of hypertension, DM, hypothyroidism came to ED with complains of shortness of breath for 2 days that worsened today associated with dry cough and fatigue. As per patient, generalized weakness started about 4 days ago associated to tiredness, poor appetite and low energy. Patient denies any fever, chills, sick contacts, nausea, vomiting, diarrhea, bleeding, chest pain or palpitations. Of note patient was vaccinated on April with [employer]  (03 Dec 2021 20:55)    ED course : Patient noted to saturating in 70s in ED on RA , was placed on NRB 15liters with improvement to 93%, Patient noted to desaturate  with minimal exertions to low 90s with wob , ICU consulted for close monitoring. Patient was admitted for ICU for closer monitoring. Patient was started on Decadron and Remdesivir. Her O2 saturations improved. she was transitioned to nasal cannula. Patient noted to be saturating above 90% transferred to medical floor for further management.     INTERVAL HPI/OVERNIGHT EVENTS:   Patient seen and examined at bedside. No overnight events. Patient reports general improvement in symptoms but complaining of SOB on exertion and sore throat.    REVIEW OF SYSTEMS:  CONSTITUTIONAL: No fever, night sweats, weight loss, +fatigue  RESPIRATORY: No cough, wheezing, or hemoptysis; +shortness of breath  CARDIOVASCULAR: No chest pain, palpitations, dizziness, or leg swelling  GASTROINTESTINAL: No abdominal pain. No nausea, vomiting, or hematemesis; No diarrhea or constipation. No melena or hematochezia.  GENITOURINARY: No dysuria or hematuria, no urinary frequency  NEUROLOGICAL: No headaches, memory loss, loss of strength, numbness, or tremors  SKIN: No itching, burning, rashes, or lesions     Vital Signs Last 24 Hrs  T(C): 36.2 (06 Dec 2021 11:28), Max: 36.7 (05 Dec 2021 15:56)  T(F): 97.2 (06 Dec 2021 11:28), Max: 98.1 (05 Dec 2021 15:56)  HR: 86 (06 Dec 2021 11:28) (75 - 86)  BP: 140/60 (06 Dec 2021 11:28) (112/62 - 140/60)  BP(mean): --  RR: 18 (06 Dec 2021 11:28) (18 - 18)  SpO2: 93% (06 Dec 2021 11:28) (92% - 99%)    PHYSICAL EXAMINATION:  GENERAL: NAD, well built, on 4L NC   HEAD:  Atraumatic, Normocephalic  EYES:  conjunctiva and sclera clear  NECK: Supple, No JVD, thyroid not examined   CHEST/LUNG: Diffuse rhonchi on auscultation. No wheezing, rales, rubs  HEART: Regular rate and rhythm; Clear S1 and S2, No murmurs, rubs, or gallops  ABDOMEN: Soft, Nontender, Nondistended; Bowel sounds present  NERVOUS SYSTEM:  Alert & Oriented X3, CNII-XII intact, no focal neurological deficits   EXTREMITIES:  2+ Peripheral Pulses, No clubbing, cyanosis, or edema  SKIN: warm dry, no lesions noted                           12.7   7.59  )-----------( 344      ( 06 Dec 2021 06:45 )             38.1     12-06    139  |  104  |  26<H>  ----------------------------<  353<H>  4.2   |  26  |  0.98    Ca    9.8      06 Dec 2021 06:45  Phos  2.2     12-06  Mg     2.5     12-06    TPro  7.2  /  Alb  2.2<L>  /  TBili  0.4  /  DBili  x   /  AST  10  /  ALT  16  /  AlkPhos  59  12-06    LIVER FUNCTIONS - ( 06 Dec 2021 06:45 )  Alb: 2.2 g/dL / Pro: 7.2 g/dL / ALK PHOS: 59 U/L / ALT: 16 U/L DA / AST: 10 U/L / GGT: x               PT/INR - ( 06 Dec 2021 06:45 )   PT: 14.5 sec;   INR: 1.23 ratio          CAPILLARY BLOOD GLUCOSE      RADIOLOGY & ADDITIONAL TESTS: PGY-1 Progress Note discussed with attending    PAGER #: [[phone]] TILL 5:00 PM  PLEASE CONTACT ON CALL TEAM:  - On Call Team (Please refer to Edna) FROM 5:00 PM - 8:30PM  - Nightfloat Team FROM 8:30 -7:30 AM    CHIEF COMPLAINT & BRIEF HOSPITAL COURSE:  66 year old female from home, lives alone, has past medical hx of hypertension, DM, hypothyroidism came to ED with complains of shortness of breath for 2 days that worsened today associated with dry cough and fatigue. As per patient, generalized weakness started about 4 days ago associated to tiredness, poor appetite and low energy. Patient denies any fever, chills, sick contacts, nausea, vomiting, diarrhea, bleeding, chest pain or palpitations. Of note patient was vaccinated on April with [employer]  (03 Dec 2021 20:55)    ED course : Patient noted to saturating in 70s in ED on RA , was placed on NRB 15liters with improvement to 93%, Patient noted to desaturate  with minimal exertions to low 90s with wob , ICU consulted for close monitoring. Patient was admitted for ICU for closer monitoring. Patient was started on Decadron and Remdesivir. Her O2 saturations improved. she was transitioned to nasal cannula. Patient noted to be saturating above 90% transferred to medical floor for further management.     INTERVAL HPI/OVERNIGHT EVENTS:   Patient seen and examined at bedside. No overnight events. Patient reports general improvement in symptoms but complaining of SOB on exertion and sore throat.    REVIEW OF SYSTEMS:  CONSTITUTIONAL: No fever, night sweats, weight loss, +fatigue  RESPIRATORY: No cough, wheezing, or hemoptysis; +shortness of breath  CARDIOVASCULAR: No chest pain, palpitations, dizziness, or leg swelling  GASTROINTESTINAL: No abdominal pain. No nausea, vomiting, or hematemesis; No diarrhea or constipation. No melena or hematochezia.  GENITOURINARY: No dysuria or hematuria, no urinary frequency  NEUROLOGICAL: No headaches, memory loss, loss of strength, numbness, or tremors  SKIN: No itching, burning, rashes, or lesions     Vital Signs Last 24 Hrs  T(C): 36.2 (06 Dec 2021 11:28), Max: 36.7 (05 Dec 2021 15:56)  T(F): 97.2 (06 Dec 2021 11:28), Max: 98.1 (05 Dec 2021 15:56)  HR: 86 (06 Dec 2021 11:28) (75 - 86)  BP: 140/60 (06 Dec 2021 11:28) (112/62 - 140/60)  BP(mean): --  RR: 18 (06 Dec 2021 11:28) (18 - 18)  SpO2: 93% (06 Dec 2021 11:28) (92% - 99%)    PHYSICAL EXAMINATION:  GENERAL: NAD, well built, on 4L NC   HEAD:  Atraumatic, Normocephalic  EYES:  conjunctiva and sclera clear  NECK: Supple, No JVD, thyroid not examined   CHEST/LUNG: Diffuse rhonchi on auscultation. No wheezing, rales, rubs  HEART: Regular rate and rhythm; Clear S1 and S2, No murmurs, rubs, or gallops  ABDOMEN: Soft, Nontender, Nondistended; Bowel sounds present  NERVOUS SYSTEM:  Alert & Oriented X3, CNII-XII intact, no focal neurological deficits   EXTREMITIES:  2+ Peripheral Pulses, No clubbing, cyanosis, or edema  SKIN: warm dry, no lesions noted                           12.7   7.59  )-----------( 344      ( 06 Dec 2021 06:45 )             38.1     12-06    139  |  104  |  26<H>  ----------------------------<  353<H>  4.2   |  26  |  0.98    Ca    9.8      06 Dec 2021 06:45  Phos  2.2     12-06  Mg     2.5     12-06    TPro  7.2  /  Alb  2.2<L>  /  TBili  0.4  /  DBili  x   /  AST  10  /  ALT  16  /  AlkPhos  59  12-06    LIVER FUNCTIONS - ( 06 Dec 2021 06:45 )  Alb: 2.2 g/dL / Pro: 7.2 g/dL / ALK PHOS: 59 U/L / ALT: 16 U/L DA / AST: 10 U/L / GGT: x               PT/INR - ( 06 Dec 2021 06:45 )   PT: 14.5 sec;   INR: 1.23 ratio          CAPILLARY BLOOD GLUCOSE      RADIOLOGY & ADDITIONAL TESTS:

## 2021-12-06 NOTE — PROGRESS NOTE ADULT - PROBLEM SELECTOR PLAN 1
CXR b/l multfocal pneumonia  - isolation precautions.  - Started on Lovenox, Remdesivir, Decadron   - started on Albuterol and ipratropium MDI   - continue to trend d-dimer, CRP, LDH, Troponin, Ferritin, CPK  - c/w O2 supplementation, titrate down as tolerated (on 4L 12/6)  - Tylenol PRN for fever  - Monitor for hemodynamic instability  f/u CTA

## 2021-12-06 NOTE — PROGRESS NOTE ADULT - ATTENDING COMMENTS
HPI:    66 year old female from home, lives alone, has past medical hx of hypertension, DM, hypothyroidism came to ED c/o shortness of breath for 2 days that worsened today associated to dry cough and fatigue. As per patient, generalized weakness started about 4 days ago associated to tiredness, poor appetite and low energy. Patient denies any fever, chills, sick contacts, nausea, vomiting, diarrhea, bleeding, chest pain or palpitations. Of note patient was vaccinated on April with [employer]  (03 Dec 2021 20:55)    - HYPOXIC RESPIRATORY FAILURE DUE TO COVID 19 PNEUMONIA - STARTED ON IV.REMDESVIR AND DECDRON ( ON 12/03/21 ) ,, & INJ. LOVENOX LOW DOSE ,  O2 SUPPLEMENTS -  . PATIENT WAS MONITORED IN ICU . PATIENT IS SEEN IMPROVING CLINICALLY ON CURRENT MANAGEMENT   -  CTA CHEST NEGATIVE FOR PE, NOTED TO HAVE INFILTRATE AND CHANGES SUSPICIOUS FOR PULM HTN  - PULMONARY CONSULT AND CARDIOLOGY CONSULT - F/U ECHOCARDIOGRAM TO EVALUATE FOR PULMONARY HYPERTENSION    - DOWN GRADE FROM ICU TEAM  TO FLOOR AFTER IMPROVING HYPOXIC RESPIRATORY FAILURE - TOLERATING WELL ON NASAL CANULA     - ISOLATION DROPLET     - DM TYPE 2, HYPOTHYROIDISM, OBESITY   - GI AND DVT PROPHYLAXIS

## 2021-12-06 NOTE — PROGRESS NOTE ADULT - PROBLEM SELECTOR PLAN 3
on glipizide, pioglitazone, alogliptin metformin, dapagliflozin at home  Holding home po medications  - Started on HSS  - Fingerstick before meals and at bedtime  - DASH diet with consistent carb  - Target -180  - F/u A1c  - target CBG < 180  started on 10u lantus

## 2021-12-07 LAB
ALBUMIN SERPL ELPH-MCNC: 2.1 G/DL — LOW (ref 3.5–5)
ALP SERPL-CCNC: 66 U/L — SIGNIFICANT CHANGE UP (ref 40–120)
ALT FLD-CCNC: 18 U/L DA — SIGNIFICANT CHANGE UP (ref 10–60)
ANION GAP SERPL CALC-SCNC: 6 MMOL/L — SIGNIFICANT CHANGE UP (ref 5–17)
AST SERPL-CCNC: 12 U/L — SIGNIFICANT CHANGE UP (ref 10–40)
BILIRUB DIRECT SERPL-MCNC: 0.1 MG/DL — SIGNIFICANT CHANGE UP (ref 0–0.3)
BILIRUB INDIRECT FLD-MCNC: 0.3 MG/DL — SIGNIFICANT CHANGE UP (ref 0.2–1)
BILIRUB SERPL-MCNC: 0.4 MG/DL — SIGNIFICANT CHANGE UP (ref 0.2–1.2)
BUN SERPL-MCNC: 20 MG/DL — HIGH (ref 7–18)
CALCIUM SERPL-MCNC: 9.3 MG/DL — SIGNIFICANT CHANGE UP (ref 8.4–10.5)
CHLORIDE SERPL-SCNC: 102 MMOL/L — SIGNIFICANT CHANGE UP (ref 96–108)
CK SERPL-CCNC: 27 U/L — SIGNIFICANT CHANGE UP (ref 21–215)
CO2 SERPL-SCNC: 28 MMOL/L — SIGNIFICANT CHANGE UP (ref 22–31)
CREAT SERPL-MCNC: 0.78 MG/DL — SIGNIFICANT CHANGE UP (ref 0.5–1.3)
CRP SERPL-MCNC: 45 MG/L — HIGH
ERYTHROCYTE [SEDIMENTATION RATE] IN BLOOD: 59 MM/HR — HIGH (ref 0–20)
GLUCOSE BLDC GLUCOMTR-MCNC: 318 MG/DL — HIGH (ref 70–99)
GLUCOSE BLDC GLUCOMTR-MCNC: 330 MG/DL — HIGH (ref 70–99)
GLUCOSE BLDC GLUCOMTR-MCNC: 338 MG/DL — HIGH (ref 70–99)
GLUCOSE BLDC GLUCOMTR-MCNC: 416 MG/DL — HIGH (ref 70–99)
GLUCOSE SERPL-MCNC: 302 MG/DL — HIGH (ref 70–99)
HCT VFR BLD CALC: 37.9 % — SIGNIFICANT CHANGE UP (ref 34.5–45)
HGB BLD-MCNC: 13 G/DL — SIGNIFICANT CHANGE UP (ref 11.5–15.5)
INR BLD: 1.21 RATIO — HIGH (ref 0.88–1.16)
LDH SERPL L TO P-CCNC: 253 U/L — HIGH (ref 120–225)
MAGNESIUM SERPL-MCNC: 1.9 MG/DL — SIGNIFICANT CHANGE UP (ref 1.6–2.6)
MCHC RBC-ENTMCNC: 30.1 PG — SIGNIFICANT CHANGE UP (ref 27–34)
MCHC RBC-ENTMCNC: 34.3 GM/DL — SIGNIFICANT CHANGE UP (ref 32–36)
MCV RBC AUTO: 87.7 FL — SIGNIFICANT CHANGE UP (ref 80–100)
NRBC # BLD: 0 /100 WBCS — SIGNIFICANT CHANGE UP (ref 0–0)
PHOSPHATE SERPL-MCNC: 2 MG/DL — LOW (ref 2.5–4.5)
PLATELET # BLD AUTO: 341 K/UL — SIGNIFICANT CHANGE UP (ref 150–400)
POTASSIUM SERPL-MCNC: 4.1 MMOL/L — SIGNIFICANT CHANGE UP (ref 3.5–5.3)
POTASSIUM SERPL-SCNC: 4.1 MMOL/L — SIGNIFICANT CHANGE UP (ref 3.5–5.3)
PROT SERPL-MCNC: 6.9 G/DL — SIGNIFICANT CHANGE UP (ref 6–8.3)
PROTHROM AB SERPL-ACNC: 14.3 SEC — HIGH (ref 10.6–13.6)
RBC # BLD: 4.32 M/UL — SIGNIFICANT CHANGE UP (ref 3.8–5.2)
RBC # FLD: 12.7 % — SIGNIFICANT CHANGE UP (ref 10.3–14.5)
SODIUM SERPL-SCNC: 136 MMOL/L — SIGNIFICANT CHANGE UP (ref 135–145)
WBC # BLD: 7.01 K/UL — SIGNIFICANT CHANGE UP (ref 3.8–10.5)
WBC # FLD AUTO: 7.01 K/UL — SIGNIFICANT CHANGE UP (ref 3.8–10.5)

## 2021-12-07 RX ORDER — POTASSIUM PHOSPHATE, MONOBASIC POTASSIUM PHOSPHATE, DIBASIC 236; 224 MG/ML; MG/ML
30 INJECTION, SOLUTION INTRAVENOUS ONCE
Refills: 0 | Status: COMPLETED | OUTPATIENT
Start: 2021-12-07 | End: 2021-12-07

## 2021-12-07 RX ORDER — INSULIN LISPRO 100/ML
3 VIAL (ML) SUBCUTANEOUS
Refills: 0 | Status: DISCONTINUED | OUTPATIENT
Start: 2021-12-07 | End: 2021-12-07

## 2021-12-07 RX ORDER — INSULIN GLARGINE 100 [IU]/ML
15 INJECTION, SOLUTION SUBCUTANEOUS AT BEDTIME
Refills: 0 | Status: DISCONTINUED | OUTPATIENT
Start: 2021-12-07 | End: 2021-12-07

## 2021-12-07 RX ORDER — INSULIN GLARGINE 100 [IU]/ML
10 INJECTION, SOLUTION SUBCUTANEOUS AT BEDTIME
Refills: 0 | Status: DISCONTINUED | OUTPATIENT
Start: 2021-12-07 | End: 2021-12-08

## 2021-12-07 RX ADMIN — SENNA PLUS 2 TABLET(S): 8.6 TABLET ORAL at 21:42

## 2021-12-07 RX ADMIN — Medication 4: at 08:08

## 2021-12-07 RX ADMIN — Medication 6: at 11:46

## 2021-12-07 RX ADMIN — BENZOCAINE AND MENTHOL 1 LOZENGE: 5; 1 LIQUID ORAL at 21:51

## 2021-12-07 RX ADMIN — ENOXAPARIN SODIUM 40 MILLIGRAM(S): 100 INJECTION SUBCUTANEOUS at 11:49

## 2021-12-07 RX ADMIN — PANTOPRAZOLE SODIUM 40 MILLIGRAM(S): 20 TABLET, DELAYED RELEASE ORAL at 06:11

## 2021-12-07 RX ADMIN — INSULIN GLARGINE 10 UNIT(S): 100 INJECTION, SOLUTION SUBCUTANEOUS at 08:09

## 2021-12-07 RX ADMIN — REMDESIVIR 500 MILLIGRAM(S): 5 INJECTION INTRAVENOUS at 21:50

## 2021-12-07 RX ADMIN — POLYETHYLENE GLYCOL 3350 17 GRAM(S): 17 POWDER, FOR SOLUTION ORAL at 11:47

## 2021-12-07 RX ADMIN — CHLORHEXIDINE GLUCONATE 1 APPLICATION(S): 213 SOLUTION TOPICAL at 06:11

## 2021-12-07 RX ADMIN — INSULIN GLARGINE 10 UNIT(S): 100 INJECTION, SOLUTION SUBCUTANEOUS at 21:42

## 2021-12-07 RX ADMIN — Medication 25 MILLIGRAM(S): at 06:11

## 2021-12-07 RX ADMIN — POTASSIUM PHOSPHATE, MONOBASIC POTASSIUM PHOSPHATE, DIBASIC 83.33 MILLIMOLE(S): 236; 224 INJECTION, SOLUTION INTRAVENOUS at 10:20

## 2021-12-07 RX ADMIN — Medication 4: at 17:37

## 2021-12-07 RX ADMIN — Medication 88 MICROGRAM(S): at 06:11

## 2021-12-07 RX ADMIN — Medication 2: at 21:42

## 2021-12-07 RX ADMIN — Medication 6 MILLIGRAM(S): at 06:11

## 2021-12-07 NOTE — PROGRESS NOTE ADULT - ATTENDING COMMENTS
HPI:    66 year old female from home, lives alone, has past medical hx of hypertension, DM, hypothyroidism came to ED c/o shortness of breath for 2 days that worsened today associated to dry cough and fatigue. As per patient, generalized weakness started about 4 days ago associated to tiredness, poor appetite and low energy. Patient denies any fever, chills, sick contacts, nausea, vomiting, diarrhea, bleeding, chest pain or palpitations. Of note patient was vaccinated on April with [employer]  (03 Dec 2021 20:55)    - HYPOXIC RESPIRATORY FAILURE DUE TO COVID 19 PNEUMONIA - STARTED ON IV.REMDESVIR AND DECDRON ( ON 12/03/21 ) ,, & INJ. LOVENOX PROPHYLAXIS ,  O2 SUPPLEMENTS -  . PATIENT WAS MONITORED IN ICU . PATIENT IS SEEN IMPROVING CLINICALLY ON CURRENT MANAGEMENT   -  CTA CHEST NEGATIVE FOR PE, NOTED TO HAVE INFILTRATE AND CHANGES SUSPICIOUS FOR PULM HTN  - PULMONARY CONSULT AND CARDIOLOGY CONSULT   - RECOMMENDATION FRO OUTPATIENT ECHOCARDIOGRAM POST-COVID   - ENCOURAGED FOR PRONE-POSITIONING AS TOLERATED    - DOWN GRADE FROM ICU TEAM  TO FLOOR AFTER IMPROVING HYPOXIC RESPIRATORY FAILURE - TOLERATING WELL ON NASAL CANULA     - ISOLATION DROPLET     - DM TYPE 2, HYPOTHYROIDISM, OBESITY   - GI AND DVT PROPHYLAXIS HPI:    66 year old female from home, lives alone, has past medical hx of hypertension, DM, hypothyroidism came to ED c/o shortness of breath for 2 days that worsened today associated to dry cough and fatigue. As per patient, generalized weakness started about 4 days ago associated to tiredness, poor appetite and low energy. Patient denies any fever, chills, sick contacts, nausea, vomiting, diarrhea, bleeding, chest pain or palpitations. Of note patient was vaccinated on April with [employer]  (03 Dec 2021 20:55)    - HYPOXIC RESPIRATORY FAILURE DUE TO COVID 19 PNEUMONIA - STARTED ON IV.REMDESVIR AND DECDRON ( ON 12/03/21 ) ,, & INJ. LOVENOX PROPHYLAXIS ,  O2 SUPPLEMENTS -  . PATIENT WAS MONITORED IN ICU . PATIENT IS SEEN IMPROVING CLINICALLY ON CURRENT MANAGEMENT   -  CTA CHEST NEGATIVE FOR PE, NOTED TO HAVE INFILTRATE AND CHANGES SUSPICIOUS FOR PULM HTN  - PULMONARY CONSULT AND CARDIOLOGY CONSULT   - RECOMMENDATION FRO OUTPATIENT ECHOCARDIOGRAM POST-COVID   - ENCOURAGED FOR PRONE-POSITIONING AS TOLERATED    - DOWN GRADE FROM ICU TEAM  TO FLOOR AFTER IMPROVING HYPOXIC RESPIRATORY FAILURE - TOLERATING WELL ON NASAL CANULA     # HYPERGLYCEMIA S/T STEROIDS, W/ UNDERLYING DM - TITRATING LANTUS, SSI + FS  - NOTED HBA1C IS 9    - ISOLATION DROPLET     - DM TYPE 2, HYPOTHYROIDISM, OBESITY   - GI AND DVT PROPHYLAXIS

## 2021-12-07 NOTE — PROGRESS NOTE ADULT - PROBLEM SELECTOR PLAN 3
on glipizide, pioglitazone, alogliptin metformin, dapagliflozin at home  Holding home po medications  - Started on HSS  - Fingerstick before meals and at bedtime  - DASH diet with consistent carb  - Target -180  - A1c 9.0  - target CBG < 180  started on 10u lantus  switched to 15u lantus + 3u admelog (12/6)  c/w ssi   consider endo consult on glipizide, pioglitazone, alogliptin metformin, dapagliflozin at home  Holding home po medications  - Started on HSS  - Fingerstick before meals and at bedtime  - DASH diet with consistent carb  - Target -180  - A1c 9.0  - target CBG < 180  started on 10u lantus  c/w ssi

## 2021-12-07 NOTE — PROGRESS NOTE ADULT - SUBJECTIVE AND OBJECTIVE BOX
PGY-1 Progress Note discussed with attending    PAGER #: [[phone]] TILL 5:00 PM  PLEASE CONTACT ON CALL TEAM:  - On Call Team (Please refer to Edna) FROM 5:00 PM - 8:30PM  - Nightfloat Team FROM 8:30 -7:30 AM    CHIEF COMPLAINT & BRIEF HOSPITAL COURSE:  66 year old female from home, lives alone, has past medical hx of hypertension, DM, hypothyroidism came to ED with complains of shortness of breath for 2 days that worsened today associated with dry cough and fatigue. As per patient, generalized weakness started about 4 days ago associated to tiredness, poor appetite and low energy. Patient denies any fever, chills, sick contacts, nausea, vomiting, diarrhea, bleeding, chest pain or palpitations. Of note patient was vaccinated on April with [employer]  (03 Dec 2021 20:55)    ED course : Patient noted to saturating in 70s in ED on RA , was placed on NRB 15liters with improvement to 93%, Patient noted to desaturate  with minimal exertions to low 90s with wob , ICU consulted for close monitoring.  ICU Course: Patient was started on Decadron and Remdesivir. Her O2 saturations improved. she was transitioned to nasal cannula. Patient noted to be saturating above 90% transferred to medical floor for further management.     INTERVAL HPI/OVERNIGHT EVENTS:   Patient seen and examined at bedside. No overnight events. Patient reports general improvement in symptoms but complaining of morning cough and SOB on exertion. Still on 4L NC sat 94%. Titrate as tolerated.    REVIEW OF SYSTEMS:  CONSTITUTIONAL: No fever, night sweats, weight loss, or fatigue  RESPIRATORY: +cough, no wheezing, or hemoptysis; +shortness of breath  CARDIOVASCULAR: No chest pain, palpitations, dizziness, or leg swelling  GASTROINTESTINAL: No abdominal pain. No nausea, vomiting, or hematemesis; No diarrhea or constipation. No melena or hematochezia.  GENITOURINARY: No dysuria or hematuria, no urinary frequency  NEUROLOGICAL: No headaches, memory loss, loss of strength, numbness, or tremors  SKIN: No itching, burning, rashes, or lesions     Vital Signs Last 24 Hrs  T(C): 36.4 (07 Dec 2021 07:00), Max: 36.8 (06 Dec 2021 19:48)  T(F): 97.6 (07 Dec 2021 07:00), Max: 98.2 (06 Dec 2021 19:48)  HR: 74 (07 Dec 2021 07:00) (71 - 86)  BP: 136/71 (07 Dec 2021 07:00) (126/80 - 140/60)  BP(mean): --  RR: 18 (07 Dec 2021 07:00) (17 - 18)  SpO2: 95% (07 Dec 2021 07:00) (91% - 95%)    PHYSICAL EXAMINATION:  GENERAL: NAD, well built, on 4L NC   HEAD:  Atraumatic, Normocephalic  EYES:  conjunctiva and sclera clear  NECK: Supple, No JVD, thyroid not examined   CHEST/LUNG: Diffuse rhonchi on auscultation. No wheezing, rales, rubs  HEART: Regular rate and rhythm; Clear S1 and S2, No murmurs, rubs, or gallops  ABDOMEN: Soft, Nontender, Nondistended; Bowel sounds present  NERVOUS SYSTEM:  Alert & Oriented X3, CNII-XII intact, no focal neurological deficits   EXTREMITIES:  2+ Peripheral Pulses, No clubbing, cyanosis, or edema  SKIN: warm dry, no lesions noted                         13.0   7.01  )-----------( 341      ( 07 Dec 2021 08:08 )             37.9     12-07    136  |  102  |  20<H>  ----------------------------<  302<H>  4.1   |  28  |  0.78    Ca    9.3      07 Dec 2021 08:08  Phos  2.0     12-07  Mg     1.9     12-07    TPro  6.9  /  Alb  2.1<L>  /  TBili  0.4  /  DBili  0.1  /  AST  12  /  ALT  18  /  AlkPhos  66  12-07    LIVER FUNCTIONS - ( 07 Dec 2021 08:08 )  Alb: 2.1 g/dL / Pro: 6.9 g/dL / ALK PHOS: 66 U/L / ALT: 18 U/L DA / AST: 12 U/L / GGT: x           CARDIAC MARKERS ( 07 Dec 2021 08:08 )  x     / x     / 27 U/L / x     / x          PT/INR - ( 07 Dec 2021 08:08 )   PT: 14.3 sec;   INR: 1.21 ratio      CAPILLARY BLOOD GLUCOSE      RADIOLOGY & ADDITIONAL TESTS:    EXAM:  CT ANGIO CHEST PULM ART WAWI                            PROCEDURE DATE:  12/06/2021          INTERPRETATION:  CLINICAL HISTORY: Covid, assess for PE    TECHNIQUE: A volumetric acquisition of the chest was obtained from the thoracic inlet tothe upper abdomen during dynamic administration of intravenous contrast. 3D MIP images were provided.    COMPARISON: No prior chest CT scan available for comparison    FINDINGS:    CTA: The study is technically adequate with a good contrast bolus to the pulmonary arteries. There are no filling defects in the pulmonary artery or its branches. The heart is normal in size. The aorta is normal in caliber. The pulmonary artery is larger than the adjacent ascending aorta. No pericardial effusion.    Lungs/Airways/Pleura: There is lower lobe predominant peripheral and peribronchovascular groundglass and consolidation throughout both lungs. There is bronchial dilatation with areas of abnormal lung parenchyma and there is lower lobe volume loss. Thereare trace left greater than right pleural effusions.    Mediastinum/Lymph nodes: No thoracic adenopathy. There is a small hiatal hernia.    Upper Abdomen: Cholecystectomy has been performed. There is a calcification in the right kidney.    Bones and Soft Tissues: No aggressive osseous lesions.    IMPRESSION:    1.  No pulmonary thromboembolism    2.  Lung findings typically seen in COVID 19 infection    3.  Enlarged pulmonary artery compared to adjacent ascending aorta suggestive of pulmonary hypertension. Correlation with echocardiogram is recommended.    --- End of Report ---       CARMEN GRIFFIN M.D., Attending Radiologist  This document has been electronically signed. Dec  6 2021  1:06PM

## 2021-12-07 NOTE — CONSULT NOTE ADULT - SUBJECTIVE AND OBJECTIVE BOX
C A R D I O L O G Y  *********************    DATE OF SERVICE: 12-07-21    HISTORY OF PRESENT ILLNESS: HPI:  66 year old female from home, lives alone, has past medical hx of hypertension, DM, hypothyroidism came to ED c/o shortness of breath for 2 days that worsened today associated to dry cough and fatigue. As per patient, generalized weakness started about 4 days ago associated to tiredness, poor appetite and low energy. Patient denies any fever, chills, sick contacts, nausea, vomiting, diarrhea, bleeding, chest pain or palpitations. Of note patient was vaccinated on April with [employer]  (03 Dec 2021 20:55)      PAST MEDICAL & SURGICAL HISTORY:  Hypertension    Diabetes mellitus    Hypothyroidism    History of cholecystectomy    History of appendectomy            MEDICATIONS:  MEDICATIONS  (STANDING):  chlorhexidine 2% Cloths 1 Application(s) Topical <User Schedule>  dexAMETHasone  Injectable 6 milliGRAM(s) IV Push daily  enoxaparin Injectable 40 milliGRAM(s) SubCutaneous daily  glucagon  Injectable 1 milliGRAM(s) IntraMuscular once  influenza  Vaccine (HIGH DOSE) 0.7 milliLiter(s) IntraMuscular once  insulin glargine Injectable (LANTUS) 10 Unit(s) SubCutaneous at bedtime  insulin lispro (ADMELOG) corrective regimen sliding scale   SubCutaneous three times a day before meals  insulin lispro (ADMELOG) corrective regimen sliding scale   SubCutaneous at bedtime  levothyroxine 88 MICROGram(s) Oral daily  metoprolol succinate ER 25 milliGRAM(s) Oral daily  pantoprazole    Tablet 40 milliGRAM(s) Oral before breakfast  polyethylene glycol 3350 17 Gram(s) Oral daily  remdesivir  IVPB   IV Intermittent   remdesivir  IVPB 100 milliGRAM(s) IV Intermittent every 24 hours  senna 2 Tablet(s) Oral at bedtime      Allergies    No Known Allergies    Intolerances        FAMILY HISTORY:    Non-contributary for premature coronary disease or sudden cardiac death    SOCIAL HISTORY:    [X ] Non-smoker  [ ] Smoker  [ ] Alcohol      REVIEW OF SYSTEMS:  [ ]chest pain  [  X]shortness of breath  [  ]palpitations  [  ]syncope  [ ]near syncope [ ]upper extremity weakness   [ ] lower extremity weakness  [  ]diplopia  [  ]altered mental status   [  ]fevers  [ ]chills [ ]nausea  [ ]vomitting  [  ]dysphagia    [ ]abdominal pain  [ ]melena  [ ]BRBPR    [  ]epistaxis  [  ]rash    [ ]lower extremity edema        [X] All others negative	  [ ] Unable to obtain      LABS:	 	    CARDIAC MARKERS:  CARDIAC MARKERS ( 07 Dec 2021 08:08 )  x     / x     / 27 U/L / x     / x                                      13.0   7.01  )-----------( 341      ( 07 Dec 2021 08:08 )             37.9     Hb Trend: 13.0<--    12-07    136  |  102  |  20<H>  ----------------------------<  302<H>  4.1   |  28  |  0.78    Ca    9.3      07 Dec 2021 08:08  Phos  2.0     12-07  Mg     1.9     12-07    TPro  6.9  /  Alb  2.1<L>  /  TBili  0.4  /  DBili  0.1  /  AST  12  /  ALT  18  /  AlkPhos  66  12-07    Creatinine Trend: 0.78<--, 0.98<--, 0.93<--, 0.80<--, 1.04<--    Coags:  PT/INR - ( 07 Dec 2021 08:08 )   PT: 14.3 sec;   INR: 1.21 ratio           PHYSICAL EXAM:  T(C): 36.4 (12-07-21 @ 07:00), Max: 36.8 (12-06-21 @ 19:48)  HR: 74 (12-07-21 @ 07:00) (71 - 76)  BP: 136/71 (12-07-21 @ 07:00) (126/80 - 139/70)  RR: 18 (12-07-21 @ 07:00) (17 - 18)  SpO2: 95% (12-07-21 @ 07:00) (91% - 95%)  Wt(kg): --   BMI (kg/m2): 29.4 (12-04-21 @ 05:00)  I&O's Summary    06 Dec 2021 07:01  -  07 Dec 2021 07:00  --------------------------------------------------------  IN: 858 mL / OUT: 2 mL / NET: 856 mL    HEENT:  (-)icterus (-)pallor  CV: N S1 S2 1/6 JUANCARLOS (+)2 Pulses B/l  Resp:  Clear to ausculatation B/L, normal effort  GI: (+) BS Soft, NT, ND  Lymph:  (-)Edema, (-)obvious lymphadenopathy  Skin: Warm to touch, Normal turgor  Psych: Appropriate mood and affect        ECG:  	Sinus     RADIOLOGY:         CXR:  sinus 95 BPM    ASSESSMENT/PLAN: 	66y Female past medical hx of hypertension, DM, hypothyroidism came to ED c/o shortness of breath for 2 days that worsened today associated to dry cough and fatigue found to be covid (+) and incidentally noted with enlarged PA on CTA concerning for elevated PA pressures.  (-) PE    - low BNP and no strai npattern on EKG is reassuring   - Outpt echo once acute covid infection resolves and off airborn  - Pulm F/u  - treatment of COVID per primary team    I once again thank you for allowing me to participate in the care of your patient.  If you have any questions or concerns please do not hesitate to contact me.    Russell Perez MD, [facility]  BEEPER [phone]      
Patient is a 66y old  Female who presents with a chief complaint of AHRF 2/2 COVID19 PNA (03 Dec 2021 23:05)      HPI:  66 year old female from home, lives alone, has past medical hx of hypertension, DM, hypothyroidism came to ED c/o shortness of breath for 2 days that worsened today associated to dry cough and fatigue. As per patient, generalized weakness started about 4 days ago associated to tiredness, poor appetite and low energy. Patient denies any fever, chills, sick contacts, nausea, vomiting, diarrhea, bleeding, chest pain or palpitations. Of note patient was vaccinated on April with [employer]  (03 Dec 2021 20:55)    ED course : Patient noted to saturating in 70s in ED on RA , was placed on NRB 15liters with improvement to 93%, Patient noted to desaturates with minimal exertions to low 90s with wob , ICU consulted for close monitoring. Upon my evaluation patient had received decadron and reported improvement in sx , pt saturates 94% on 10 liters NRB, pt tends to desaturate on coughing and movement,  unable to fully inspirate, will admit to ICU for close monitoring.         Allergies    No Known Allergies    Intolerances        MEDICATIONS  (STANDING):  enoxaparin Injectable 40 milliGRAM(s) SubCutaneous daily  insulin lispro (ADMELOG) corrective regimen sliding scale   SubCutaneous three times a day before meals  insulin lispro (ADMELOG) corrective regimen sliding scale   SubCutaneous at bedtime  levothyroxine 88 MICROGram(s) Oral daily  metoprolol succinate ER 25 milliGRAM(s) Oral daily  pantoprazole    Tablet 40 milliGRAM(s) Oral before breakfast  pantoprazole  Injectable 40 milliGRAM(s) IV Push once  remdesivir  IVPB   IV Intermittent   remdesivir  IVPB 200 milliGRAM(s) IV Intermittent every 24 hours  remdesivir  IVPB 100 milliGRAM(s) IV Intermittent every 24 hours    MEDICATIONS  (PRN):  acetaminophen     Tablet .. 650 milliGRAM(s) Oral every 6 hours PRN Temp greater or equal to 38C (100.4F), Mild Pain (1 - 3)  ALBUTerol    90 MICROgram(s) HFA Inhaler 2 Puff(s) Inhalation every 6 hours PRN Shortness of Breath and/or Wheezing  guaiFENesin Oral Liquid (Sugar-Free) 200 milliGRAM(s) Oral every 6 hours PRN Cough      Daily Height in cm: 162.56 (03 Dec 2021 15:35)    Daily     Drug Dosing Weight  Height (cm): 162.6 (03 Dec 2021 15:35)  Weight (kg): 77.8 (03 Dec 2021 15:35)  BMI (kg/m2): 29.4 (03 Dec 2021 15:35)  BSA (m2): 1.83 (03 Dec 2021 15:35)    PAST MEDICAL & SURGICAL HISTORY:  Hypertension    Diabetes mellitus    Hypothyroidism    History of cholecystectomy    History of appendectomy        FAMILY HISTORY:      SOCIAL HISTORY:    ADVANCE DIRECTIVES:            ICU Vital Signs Last 24 Hrs  T(C): 37.1 (03 Dec 2021 23:50), Max: 37.3 (03 Dec 2021 15:35)  T(F): 98.7 (03 Dec 2021 23:50), Max: 99.1 (03 Dec 2021 15:35)  HR: 105 (03 Dec 2021 23:50) (95 - 105)  BP: 135/79 (03 Dec 2021 23:50) (105/83 - 135/79)  BP(mean): --  ABP: --  ABP(mean): --  RR: 19 (03 Dec 2021 23:50) (19 - 20)  SpO2: 94% (03 Dec 2021 23:50) (94% - 95%)          I&O's Detail      PHYSICAL EXAM:    GENERAL: NAD, on NRB   HEAD:  Atraumatic, Normocephalic  EYES: EOMI, PERRLA, conjunctiva and sclera clear  ENMT: Dry Mucosa  NECK: Supple, No JVD, Normal thyroid  NERVOUS SYSTEM:  Alert & Oriented X3, Good concentration; Motor Strength 5/5 B/L upper and lower extremities; DTRs 2+ intact and symmetric  CHEST/LUNG: bilateral fine crackles   HEART: Regular rate and rhythm; No murmurs, rubs, or gallops  ABDOMEN: Soft, Nontender, Nondistended; Bowel sounds present  EXTREMITIES:  2+ Peripheral Pulses, No clubbing, cyanosis, or edema  LYMPH: No lymphadenopathy noted  SKIN: No rashes or lesions    LABS:  CBC Full  -  ( 03 Dec 2021 16:21 )  WBC Count : 6.01 K/uL  RBC Count : 4.28 M/uL  Hemoglobin : 12.7 g/dL  Hematocrit : 38.6 %  Platelet Count - Automated : 207 K/uL  Mean Cell Volume : 90.2 fl  Mean Cell Hemoglobin : 29.7 pg  Mean Cell Hemoglobin Concentration : 32.9 gm/dL  Auto Neutrophil # : 4.21 K/uL  Auto Lymphocyte # : 1.32 K/uL  Auto Monocyte # : 0.42 K/uL  Auto Eosinophil # : 0.00 K/uL  Auto Basophil # : 0.02 K/uL  Auto Neutrophil % : 70.0 %  Auto Lymphocyte % : 22.0 %  Auto Monocyte % : 7.0 %  Auto Eosinophil % : 0.0 %  Auto Basophil % : 0.3 %    12-03    132<L>  |  98  |  12  ----------------------------<  193<H>  4.2   |  25  |  1.04    Ca    9.4      03 Dec 2021 16:21    TPro  7.9  /  Alb  2.6<L>  /  TBili  0.6  /  DBili  x   /  AST  27  /  ALT  23  /  AlkPhos  56  12-03    CAPILLARY BLOOD GLUCOSE      POCT Blood Glucose.: 173 mg/dL (03 Dec 2021 23:45)    PT/INR - ( 03 Dec 2021 16:21 )   PT: 14.5 sec;   INR: 1.23 ratio         PTT - ( 03 Dec 2021 16:21 )  PTT:33.7 sec            EKG:    ECHO, US:    RADIOLOGY:    CRITICAL CARE TIME SPENT:

## 2021-12-08 ENCOUNTER — TRANSCRIPTION ENCOUNTER (OUTPATIENT)
Age: 66
End: 2021-12-08

## 2021-12-08 LAB
ALBUMIN SERPL ELPH-MCNC: 2 G/DL — LOW (ref 3.5–5)
ALP SERPL-CCNC: 64 U/L — SIGNIFICANT CHANGE UP (ref 40–120)
ALT FLD-CCNC: 19 U/L DA — SIGNIFICANT CHANGE UP (ref 10–60)
ANION GAP SERPL CALC-SCNC: 6 MMOL/L — SIGNIFICANT CHANGE UP (ref 5–17)
AST SERPL-CCNC: 16 U/L — SIGNIFICANT CHANGE UP (ref 10–40)
BILIRUB DIRECT SERPL-MCNC: 0.1 MG/DL — SIGNIFICANT CHANGE UP (ref 0–0.3)
BILIRUB INDIRECT FLD-MCNC: 0.3 MG/DL — SIGNIFICANT CHANGE UP (ref 0.2–1)
BILIRUB SERPL-MCNC: 0.4 MG/DL — SIGNIFICANT CHANGE UP (ref 0.2–1.2)
BUN SERPL-MCNC: 14 MG/DL — SIGNIFICANT CHANGE UP (ref 7–18)
CALCIUM SERPL-MCNC: 9.4 MG/DL — SIGNIFICANT CHANGE UP (ref 8.4–10.5)
CHLORIDE SERPL-SCNC: 101 MMOL/L — SIGNIFICANT CHANGE UP (ref 96–108)
CO2 SERPL-SCNC: 30 MMOL/L — SIGNIFICANT CHANGE UP (ref 22–31)
CREAT SERPL-MCNC: 0.74 MG/DL — SIGNIFICANT CHANGE UP (ref 0.5–1.3)
CRP SERPL-MCNC: 30 MG/L — HIGH
CULTURE RESULTS: SIGNIFICANT CHANGE UP
CULTURE RESULTS: SIGNIFICANT CHANGE UP
GLUCOSE BLDC GLUCOMTR-MCNC: 267 MG/DL — HIGH (ref 70–99)
GLUCOSE BLDC GLUCOMTR-MCNC: 332 MG/DL — HIGH (ref 70–99)
GLUCOSE BLDC GLUCOMTR-MCNC: 387 MG/DL — HIGH (ref 70–99)
GLUCOSE BLDC GLUCOMTR-MCNC: 450 MG/DL — CRITICAL HIGH (ref 70–99)
GLUCOSE SERPL-MCNC: 275 MG/DL — HIGH (ref 70–99)
HCT VFR BLD CALC: 38 % — SIGNIFICANT CHANGE UP (ref 34.5–45)
HGB BLD-MCNC: 12.7 G/DL — SIGNIFICANT CHANGE UP (ref 11.5–15.5)
INR BLD: 1.24 RATIO — HIGH (ref 0.88–1.16)
LDH SERPL L TO P-CCNC: 251 U/L — HIGH (ref 120–225)
MAGNESIUM SERPL-MCNC: 2.1 MG/DL — SIGNIFICANT CHANGE UP (ref 1.6–2.6)
MCHC RBC-ENTMCNC: 29.3 PG — SIGNIFICANT CHANGE UP (ref 27–34)
MCHC RBC-ENTMCNC: 33.4 GM/DL — SIGNIFICANT CHANGE UP (ref 32–36)
MCV RBC AUTO: 87.6 FL — SIGNIFICANT CHANGE UP (ref 80–100)
NRBC # BLD: 0 /100 WBCS — SIGNIFICANT CHANGE UP (ref 0–0)
PHOSPHATE SERPL-MCNC: 2.3 MG/DL — LOW (ref 2.5–4.5)
PLATELET # BLD AUTO: 337 K/UL — SIGNIFICANT CHANGE UP (ref 150–400)
POTASSIUM SERPL-MCNC: 4.1 MMOL/L — SIGNIFICANT CHANGE UP (ref 3.5–5.3)
POTASSIUM SERPL-SCNC: 4.1 MMOL/L — SIGNIFICANT CHANGE UP (ref 3.5–5.3)
PROT SERPL-MCNC: 6.4 G/DL — SIGNIFICANT CHANGE UP (ref 6–8.3)
PROTHROM AB SERPL-ACNC: 14.6 SEC — HIGH (ref 10.6–13.6)
RBC # BLD: 4.34 M/UL — SIGNIFICANT CHANGE UP (ref 3.8–5.2)
RBC # FLD: 12.4 % — SIGNIFICANT CHANGE UP (ref 10.3–14.5)
SODIUM SERPL-SCNC: 137 MMOL/L — SIGNIFICANT CHANGE UP (ref 135–145)
SPECIMEN SOURCE: SIGNIFICANT CHANGE UP
SPECIMEN SOURCE: SIGNIFICANT CHANGE UP
WBC # BLD: 7.19 K/UL — SIGNIFICANT CHANGE UP (ref 3.8–10.5)
WBC # FLD AUTO: 7.19 K/UL — SIGNIFICANT CHANGE UP (ref 3.8–10.5)

## 2021-12-08 RX ORDER — INSULIN LISPRO 100/ML
3 VIAL (ML) SUBCUTANEOUS
Refills: 0 | Status: DISCONTINUED | OUTPATIENT
Start: 2021-12-08 | End: 2021-12-10

## 2021-12-08 RX ORDER — INSULIN LISPRO 100/ML
5 VIAL (ML) SUBCUTANEOUS ONCE
Refills: 0 | Status: COMPLETED | OUTPATIENT
Start: 2021-12-08 | End: 2021-12-08

## 2021-12-08 RX ORDER — SODIUM,POTASSIUM PHOSPHATES 278-250MG
1 POWDER IN PACKET (EA) ORAL ONCE
Refills: 0 | Status: COMPLETED | OUTPATIENT
Start: 2021-12-08 | End: 2021-12-08

## 2021-12-08 RX ORDER — INSULIN GLARGINE 100 [IU]/ML
15 INJECTION, SOLUTION SUBCUTANEOUS AT BEDTIME
Refills: 0 | Status: DISCONTINUED | OUTPATIENT
Start: 2021-12-08 | End: 2021-12-09

## 2021-12-08 RX ADMIN — Medication 25 MILLIGRAM(S): at 05:46

## 2021-12-08 RX ADMIN — ENOXAPARIN SODIUM 40 MILLIGRAM(S): 100 INJECTION SUBCUTANEOUS at 12:07

## 2021-12-08 RX ADMIN — Medication 3: at 07:54

## 2021-12-08 RX ADMIN — POLYETHYLENE GLYCOL 3350 17 GRAM(S): 17 POWDER, FOR SOLUTION ORAL at 12:37

## 2021-12-08 RX ADMIN — Medication 5: at 12:06

## 2021-12-08 RX ADMIN — Medication 1 PACKET(S): at 12:06

## 2021-12-08 RX ADMIN — PANTOPRAZOLE SODIUM 40 MILLIGRAM(S): 20 TABLET, DELAYED RELEASE ORAL at 05:46

## 2021-12-08 RX ADMIN — INSULIN GLARGINE 15 UNIT(S): 100 INJECTION, SOLUTION SUBCUTANEOUS at 21:19

## 2021-12-08 RX ADMIN — Medication 5 UNIT(S): at 17:22

## 2021-12-08 RX ADMIN — Medication 3 UNIT(S): at 17:16

## 2021-12-08 RX ADMIN — CHLORHEXIDINE GLUCONATE 1 APPLICATION(S): 213 SOLUTION TOPICAL at 05:47

## 2021-12-08 RX ADMIN — Medication 2: at 21:20

## 2021-12-08 RX ADMIN — ALBUTEROL 2 PUFF(S): 90 AEROSOL, METERED ORAL at 21:21

## 2021-12-08 RX ADMIN — Medication 88 MICROGRAM(S): at 05:46

## 2021-12-08 RX ADMIN — Medication 6 MILLIGRAM(S): at 05:46

## 2021-12-08 NOTE — PROGRESS NOTE ADULT - PROBLEM SELECTOR PLAN 1
CXR b/l multfocal pneumonia  CTA:  1.  No PE  2.  Lung findings typically seen in COVID 19 infection  3.  Enlarged pulmonary artery compared to adjacent ascending aorta suggestive of pulmonary hypertension. Correlation with echocardiogram is recommended.  Echo as outpatient   - started on Albuterol and ipratropium MDI   - continue to trend d-dimer, CRP, LDH, Troponin, Ferritin, CPK  - Tylenol PRN for fever  - Monitor for hemodynamic instability  - s/p Remdesivir (5/5 days)  - c/w isolation precautions.  - c/w decadron   - c/w O2 supplementation, titrate down as tolerated (on 3L 12/8)  Cardio Aan consulted

## 2021-12-08 NOTE — DISCHARGE NOTE PROVIDER - PROVIDER TOKENS
FREE:[LAST:[Raul],FIRST:[Imtiaz],PHONE:[phone]],FAX:[(   )    -]],PROVIDER:[TOKEN:[[id number]]] PROVIDER:[TOKEN:[[id number]]],FREE:[LAST:[Raul],FIRST:[Itmiaz],PHONE:[phone]],FAX:[(   )    -]],PROVIDER:[TOKEN:[[id number]],FOLLOWUP:[1 week]]

## 2021-12-08 NOTE — PROGRESS NOTE ADULT - ATTENDING COMMENTS
HPI:    66 year old female from home, lives alone, has past medical hx of hypertension, DM, hypothyroidism came to ED c/o shortness of breath for 2 days that worsened today associated to dry cough and fatigue. As per patient, generalized weakness started about 4 days ago associated to tiredness, poor appetite and low energy. Patient denies any fever, chills, sick contacts, nausea, vomiting, diarrhea, bleeding, chest pain or palpitations. Of note patient was vaccinated on April with [employer].  (03 Dec 2021 20:55)    - HYPOXIC RESPIRATORY FAILURE DUE TO COVID 19 PNEUMONIA - STARTED ON IV REMDESEVIR AND DECADRON ( ON 12/03/21 ) & INJ. LOVENOX PROPHYLAXIS ,  O2 SUPPLEMENTS. PATIENT WAS MONITORED IN ICU . PATIENT IS SEEN IMPROVING CLINICALLY ON CURRENT MANAGEMENT   -  CTA CHEST NEGATIVE FOR PE, NOTED TO HAVE INFILTRATE AND CHANGES SUSPICIOUS FOR PULM HTN  - PULMONARY CONSULT AND CARDIOLOGY CONSULT   - RECOMMENDATION FOR OUTPATIENT ECHOCARDIOGRAM POST-COVID   - ENCOURAGED FOR PRONE-POSITIONING AS TOLERATED    - DOWN GRADE FROM ICU TEAM  TO FLOOR AFTER IMPROVING HYPOXIC RESPIRATORY FAILURE - TOLERATING WELL ON NASAL CANULA     # HYPERGLYCEMIA S/T STEROIDS, W/ UNDERLYING DM - TITRATING LANTUS, SSI + FS  - NOTED HBA1C IS 9    - ISOLATION DROPLET     - DM TYPE 2, HYPOTHYROIDISM, OBESITY   - GI AND DVT PROPHYLAXIS

## 2021-12-08 NOTE — PROGRESS NOTE ADULT - PROBLEM SELECTOR PLAN 3
on glipizide, pioglitazone, alogliptin metformin, dapagliflozin at home  Holding home po medications  - Started on HSS  - Fingerstick before meals and at bedtime  - DASH diet with consistent carb  - Target -180  - A1c 9.0  - target CBG < 180  started on 10u lantus  c/w ssi

## 2021-12-08 NOTE — DISCHARGE NOTE PROVIDER - NSDCCAREPROVSEEN_GEN_ALL_CORE_FT
Mary Anne, Jerry Hobbs, Juan Taylor, Rodolfo Bland, Raul Viveros, Lashonda Plunkett, Kenisha Plunkett, Brett

## 2021-12-08 NOTE — DISCHARGE NOTE PROVIDER - HOSPITAL COURSE
66 year old female from home, lives alone, has past medical hx of hypertension, DM, hypothyroidism came to ED with complains of shortness of breath for 2 days that worsened today associated with dry cough and fatigue. As per patient, generalized weakness started about 4 days ago associated to tiredness, poor appetite and low energy. Patient denies any fever, chills, sick contacts, nausea, vomiting, diarrhea, bleeding, chest pain or palpitations. Of note patient was vaccinated on April with [employer]. Patient noted to saturating in 70s in ED on RA , was placed on NRB 15liters with improvement to 93%, Patient noted to desaturate  with minimal exertions to low 90s. Patient was admitted for ICU for closer monitoring and was given 15L NRB. Patient was started on Decadron and Remdesivir. Her O2 saturations improved. She was transitioned to nasal cannula and determined to be stable for downgrade to medical floor. CT angio showed no evidence of PE but suspicious findings for pulmonary hypertension which can be followed with outpatient echo per Cardio Dr. Perez. Patient finished course of remdesivir and oxygen was titrated down. Patient's HTN and DM and Hypothyroidism were managed while on medical floor. PT recommended: ____  Patient medically stable for discharge with outpatient PCP follow up. 66 year old female from home, lives alone, has past medical hx of hypertension, DM, hypothyroidism came to ED with complains of shortness of breath for 2 days that worsened today associated with dry cough and fatigue. As per patient, generalized weakness started about 4 days ago associated to tiredness, poor appetite and low energy. Patient denies any fever, chills, sick contacts, nausea, vomiting, diarrhea, bleeding, chest pain or palpitations. Of note patient was vaccinated on April with [employer]. Patient noted to saturating in 70s in ED on RA , was placed on NRB 15liters with improvement to 93%, Patient noted to desaturate  with minimal exertions to low 90s. Patient was admitted for ICU for closer monitoring and was given 15L NRB. Patient was started on Decadron and Remdesivir. Her O2 saturations improved. She was transitioned to nasal cannula and determined to be stable for downgrade to medical floor. CT angio showed no evidence of PE but suspicious findings for pulmonary hypertension which can be followed with outpatient echo per Cardio Dr. Perez. Patient finished course of remdesivir and oxygen was titrated down. Patient's HTN and DM and Hypothyroidism were managed while on medical floor. Patient medically stable with home O2 and outpatient PCP and cardio follow up. 66 year old female from home, lives alone, has past medical hx of hypertension, DM, hypothyroidism came to ED with complains of shortness of breath for 2 days that worsened today associated with dry cough and fatigue. As per patient, generalized weakness started about 4 days ago associated to tiredness, poor appetite and low energy. Patient denies any fever, chills, sick contacts, nausea, vomiting, diarrhea, bleeding, chest pain or palpitations. Of note patient was vaccinated on April with [employer]. Patient noted to saturating in 70s in ED on RA , was placed on NRB 15liters with improvement to 93%, Patient noted to desaturate  with minimal exertions to low 90s. Patient was admitted for ICU for closer monitoring and was given 15L NRB. Patient was started on Decadron and Remdesivir. Her O2 saturations improved. She was transitioned to nasal cannula and determined to be stable for downgrade to medical floor. CT angio showed no evidence of PE but suspicious findings for pulmonary hypertension which can be followed with outpatient echo per Cardio Dr. Perez. Patient finished course of remdesivir and oxygen was titrated down. Patient's HTN and DM and Hypothyroidism were managed while on medical floor. Patient medically stable for discharge with home O2, steroid taper, 30 day course of xarelto, and insulin. 66 year old female from home, lives alone, has past medical hx of hypertension, DM, hypothyroidism came to ED with complains of shortness of breath for 2 days that worsened today associated with dry cough and fatigue. As per patient, generalized weakness started about 4 days ago associated to tiredness, poor appetite and low energy. Patient denies any fever, chills, sick contacts, nausea, vomiting, diarrhea, bleeding, chest pain or palpitations. Of note patient was vaccinated on April with [employer]. Patient noted to saturating in 70s in ED on RA , was placed on NRB 15liters with improvement to 93%, Patient noted to desaturate  with minimal exertions to low 90s. Patient was admitted for ICU for closer monitoring and was given 15L NRB. Patient was started on Decadron and Remdesivir. Her O2 saturations improved. She was transitioned to nasal cannula and determined to be stable for downgrade to medical floor. CT angio showed no evidence of PE but suspicious findings for pulmonary hypertension which can be followed with outpatient echo per Cardio Dr. Perez. Patient finished course of remdesivir and oxygen was titrated down. Patient's HTN and DM and Hypothyroidism were managed while on medical floor. Patient medically stable for discharge with home O2, steroid taper, 30 day course of xarelto, and insulin. Patient to continue insulin only until completion of steroids. Patient to RESUME home diabetes medications. To follow up with primary care within the week.    Given patient's improved clinical status and current hemodynamic stability, decision was made to discharge. Discussed with attending. Please refer to patient's complete medical chart with documents for a full hospital course, for this is only a brief summary.   66 year old female from home, lives alone, has past medical hx of hypertension, DM, hypothyroidism came to ED with complains of shortness of breath for 2 days that worsened today associated with dry cough and fatigue. As per patient, generalized weakness started about 4 days ago associated to tiredness, poor appetite and low energy. Patient denies any fever, chills, sick contacts, nausea, vomiting, diarrhea, bleeding, chest pain or palpitations. Of note patient was vaccinated on April with [employer]. Patient noted to saturating in 70s in ED on RA , was placed on NRB 15liters with improvement to 93%, Patient noted to desaturate  with minimal exertions to low 90s. Patient was admitted for ICU for closer monitoring and was given 15L NRB. Patient was started on Decadron and Remdesivir. Her O2 saturations improved. She was transitioned to nasal cannula and determined to be stable for downgrade to medical floor. CT angio showed no evidence of PE but suspicious findings for pulmonary hypertension which can be followed with outpatient echo per Cardio Dr. Perez. Patient finished course of remdesivir and oxygen was titrated down. Patient's HTN and DM and Hypothyroidism were managed while on medical floor. Patient medically stable for discharge with home O2, steroid taper, 30 day course of xarelto, and insulin. Patient to continue insulin only until completion of steroids. Patient to RESUME home diabetes medications Sunday 12/19. To follow up with primary care within the week.    Given patient's improved clinical status and current hemodynamic stability, decision was made to discharge. Discussed with attending. Please refer to patient's complete medical chart with documents for a full hospital course, for this is only a brief summary.

## 2021-12-08 NOTE — DISCHARGE NOTE PROVIDER - NSDCCPCAREPLAN_GEN_ALL_CORE_FT
PRINCIPAL DISCHARGE DIAGNOSIS  Diagnosis: Acute hypoxemic respiratory failure due to COVID-19  Assessment and Plan of Treatment: You came to the hospital complaining of shortness of breath, cough, and fatigue for a few days duration. You were found to be positive for COVID-19. You were given supplemental oxygen and admitted for close monitoring in the ICU. We also started remdesivir and decadron (an antiviral and steroid) per COVID treatment protocol with symotpmatic improvement. You were titrated down to ____L oxygen supplementation on nasal cannula on the medical floor and are now stable for discharge . Please continue to isolate when you get home for a total of 10 days since first positive test. Please follow up with your primary doctor within 1-2 weeks of discharge and routinely after. In addition, A CT angio of your chest showed that you may have pulmonary hypertension  It is imperative that you see your cardiologist or Dr. Perez, the cardiolgoist who followed you during hospitalization (details below) for an outpatient echocardiogram for further management.      SECONDARY DISCHARGE DIAGNOSES  Diagnosis: Diabetes mellitus  Assessment and Plan of Treatment: You have previosuly been diagnosed with diabetes mellitus for which you take glipizide, pioglitazone, metformin, and dapaglifozin at home. Your HbA1c at the hospital was 9.0. We stopped your home medication and managed you on sliding scale insulin while you were hospitalized with good glycemic control. Continue with your blood sugar medication. Please check your blood sugar levels twice daily - Morning/Afternoon, and Lunch/Bedtime the following day. It is important to keep a record of your blood sugar readings. We recommend you maintain a healthy diet that is low in sugar, carbohydrates and fat. Please limit your intake of high sugar and high carbohydrate foods such as pasta, rice, and bread. Exercise frequently as tolerated. Please follow up with you primary care physician within one week of your discharge to further manage your diabetes and monitor your Hemoglobin A1c levels after 3 months to evaluate your diabetes control.    Diagnosis: Hypertension  Assessment and Plan of Treatment: You have previously been diagnosed with hypertension (high blood pressure). We managed your blood pressure during your hospitalization with you home medications. Please take your medications on time and as prescribed. Monitor your blood pressure at home, keep a log of your home readings and follow up with your Primary Care Physician. As per AHA/ACC guidelines, it is important to adhere to a DASH Diet of fresh fruits, vegetables, lean meats such as poultry and fish, and whole wheat carbs. 30 minutes of aerobic exercise per day 3-4 times a week, reducing salt intake <1.5g/day, and cutting down on highly processed foods are also shown to reduce high blood pressure. Uncontrolled BP may result in organ damage to your eyes, brain, heart, and kidneys by causing strokes, heart attacks, kidney failure, and bleeds in your eye. Please follow up with your primary care physician within 1-2 weeks after discharge and routinely after for further management.    Diagnosis: Hypothyroidism  Assessment and Plan of Treatment: You have previously been diagnosed with hypothyroidism. We continued your home medication during the hospitalization. Please follow up with your primary doctor within 1-2 weeks of discharge and routinely after for further management.     PRINCIPAL DISCHARGE DIAGNOSIS  Diagnosis: Acute hypoxemic respiratory failure due to COVID-19  Assessment and Plan of Treatment: You came to the hospital complaining of shortness of breath, cough, and fatigue for a few days duration. You were found to be positive for COVID-19. You were given supplemental oxygen and admitted for close monitoring in the ICU. We also started remdesivir and decadron (an antiviral and steroid) per COVID treatment protocol with symotpmatic improvement. You were titrated down to 2L oxygen supplementation on nasal cannula on the medical floor and are now stable for discharge . Please continue to isolate when you get home for a total of 10 days since first positive test. Please follow up with your primary doctor within 1-2 weeks of discharge and routinely after. In addition, A CT angio of your chest showed that you may have pulmonary hypertension  It is imperative that you see your cardiologist or Dr. Perez, the cardiolgoist who followed you during hospitalization (details below) for an outpatient echocardiogram for further management.      SECONDARY DISCHARGE DIAGNOSES  Diagnosis: Diabetes mellitus  Assessment and Plan of Treatment: You have previosuly been diagnosed with diabetes mellitus for which you take glipizide, pioglitazone, metformin, and dapaglifozin at home. Your HbA1c at the hospital was 9.0. We stopped your home medication and managed you on sliding scale insulin while you were hospitalized with good glycemic control. Continue with your blood sugar medication. Please check your blood sugar levels twice daily - Morning/Afternoon, and Lunch/Bedtime the following day. It is important to keep a record of your blood sugar readings. We recommend you maintain a healthy diet that is low in sugar, carbohydrates and fat. Please limit your intake of high sugar and high carbohydrate foods such as pasta, rice, and bread. Exercise frequently as tolerated. Please follow up with you primary care physician within one week of your discharge to further manage your diabetes and monitor your Hemoglobin A1c levels after 3 months to evaluate your diabetes control.    Diagnosis: Hypertension  Assessment and Plan of Treatment: You have previously been diagnosed with hypertension (high blood pressure). We managed your blood pressure during your hospitalization with you home medications. Please take your medications on time and as prescribed. Monitor your blood pressure at home, keep a log of your home readings and follow up with your Primary Care Physician. As per AHA/ACC guidelines, it is important to adhere to a DASH Diet of fresh fruits, vegetables, lean meats such as poultry and fish, and whole wheat carbs. 30 minutes of aerobic exercise per day 3-4 times a week, reducing salt intake <1.5g/day, and cutting down on highly processed foods are also shown to reduce high blood pressure. Uncontrolled BP may result in organ damage to your eyes, brain, heart, and kidneys by causing strokes, heart attacks, kidney failure, and bleeds in your eye. Please follow up with your primary care physician within 1-2 weeks after discharge and routinely after for further management.    Diagnosis: Hypothyroidism  Assessment and Plan of Treatment: You have previously been diagnosed with hypothyroidism. We continued your home medication during the hospitalization. Please follow up with your primary doctor within 1-2 weeks of discharge and routinely after for further management.     PRINCIPAL DISCHARGE DIAGNOSIS  Diagnosis: Acute hypoxemic respiratory failure due to COVID-19  Assessment and Plan of Treatment: You came to the hospital complaining of shortness of breath, cough, and fatigue for a few days duration. You were found to be positive for COVID-19. You were given supplemental oxygen and admitted for close monitoring in the ICU. We also started remdesivir and decadron (an antiviral and steroid) per COVID treatment protocol with symotpmatic improvement. You were titrated down to 2L oxygen supplementation on nasal cannula on the medical floor and are now stable for discharge . We have arranged home oxygen for you and are sending you a prescription for xarelto which is a bloo dthinner to prevent COVID-related blood clots. Please take this medication as prescribed, once a day for 30 days. Please continue to isolate when you get home for a total of 10 days since first positive test. Please follow up with your primary doctor within 1-2 weeks of discharge and routinely after. In addition, A CT angio of your chest showed that you may have pulmonary hypertension  It is imperative that you see your cardiologist or Dr. Perez, the cardiolgoist who followed you during hospitalization (details below) for an outpatient echocardiogram for further management.      SECONDARY DISCHARGE DIAGNOSES  Diagnosis: Diabetes mellitus  Assessment and Plan of Treatment: You have previosuly been diagnosed with diabetes mellitus for which you take glipizide, pioglitazone, metformin, and dapaglifozin at home. Your HbA1c at the hospital was 9.0. We stopped your home medication and managed you on sliding scale insulin while you were hospitalized with good glycemic control. Continue with your blood sugar medication. Please check your blood sugar levels twice daily - Morning/Afternoon, and Lunch/Bedtime the following day. It is important to keep a record of your blood sugar readings. We recommend you maintain a healthy diet that is low in sugar, carbohydrates and fat. Please limit your intake of high sugar and high carbohydrate foods such as pasta, rice, and bread. Exercise frequently as tolerated. Please follow up with you primary care physician within one week of your discharge to further manage your diabetes and monitor your Hemoglobin A1c levels after 3 months to evaluate your diabetes control.    Diagnosis: Hypertension  Assessment and Plan of Treatment: You have previously been diagnosed with hypertension (high blood pressure). We managed your blood pressure during your hospitalization with you home medications. Please take your medications on time and as prescribed. Monitor your blood pressure at home, keep a log of your home readings and follow up with your Primary Care Physician. As per AHA/ACC guidelines, it is important to adhere to a DASH Diet of fresh fruits, vegetables, lean meats such as poultry and fish, and whole wheat carbs. 30 minutes of aerobic exercise per day 3-4 times a week, reducing salt intake <1.5g/day, and cutting down on highly processed foods are also shown to reduce high blood pressure. Uncontrolled BP may result in organ damage to your eyes, brain, heart, and kidneys by causing strokes, heart attacks, kidney failure, and bleeds in your eye. Please follow up with your primary care physician within 1-2 weeks after discharge and routinely after for further management.    Diagnosis: Hypothyroidism  Assessment and Plan of Treatment: You have previously been diagnosed with hypothyroidism. We continued your home medication during the hospitalization. Please follow up with your primary doctor within 1-2 weeks of discharge and routinely after for further management.     PRINCIPAL DISCHARGE DIAGNOSIS  Diagnosis: Acute hypoxemic respiratory failure due to COVID-19  Assessment and Plan of Treatment: You came to the hospital complaining of shortness of breath, cough, and fatigue for a few days duration. You were found to be positive for COVID-19. You were given supplemental oxygen and admitted for close monitoring in the ICU. We also started remdesivir and decadron (an antiviral and steroid) per COVID treatment protocol with symotpmatic improvement. You were titrated down to 2L oxygen supplementation on nasal cannula on the medical floor and are now stable for discharge . We have arranged home oxygen for you and are sending you a prescription for xarelto which is a blood thinner to prevent COVID-related blood clots. Please take this medication as prescribed, once a day for 30 days. We are also discharging you on a course of steroids. Please take 4mg for 2 days, (4 pills for 2 days) 2mg for 2 days (2 pills for 2 days), and then 1 mg for 2 days (1 pills for 2 days).  Please continue to isolate when you get home for a total of 10 days since first positive test. Please follow up with your primary doctor within 1-2 weeks of discharge and routinely after. In addition, A CT angio of your chest showed that you may have pulmonary hypertension  It is imperative that you see your cardiologist or Dr. Perez, the cardiolgoist who followed you during hospitalization (details below) for an outpatient echocardiogram for further management.      SECONDARY DISCHARGE DIAGNOSES  Diagnosis: Diabetes mellitus  Assessment and Plan of Treatment: You have previously been diagnosed with diabetes mellitus for which you take glipizide, pioglitazone, metformin, and dapaglifozin at home. Your HbA1c at the hospital was 9.0. We stopped your home medication and managed you on sliding scale insulin while you were hospitalized with good glycemic control. We are discharging you on insulin lantus. Please take 18 units at bedtime. We also consulted Dr. Lancaster, endocrinologist who you can follow up with as an outpatient. Continue with your blood sugar medication. Please check your blood sugar levels twice daily - Morning/Afternoon, and Lunch/Bedtime the following day. It is important to keep a record of your blood sugar readings. We recommend you maintain a healthy diet that is low in sugar, carbohydrates and fat. Please limit your intake of high sugar and high carbohydrate foods such as pasta, rice, and bread. Exercise frequently as tolerated. Please follow up with you primary care physician within one week of your discharge to further manage your diabetes and monitor your Hemoglobin A1c levels after 3 months to evaluate your diabetes control.    Diagnosis: Hypertension  Assessment and Plan of Treatment: You have previously been diagnosed with hypertension (high blood pressure). We managed your blood pressure during your hospitalization with you home medications. Please take your medications on time and as prescribed. Monitor your blood pressure at home, keep a log of your home readings and follow up with your Primary Care Physician. As per AHA/ACC guidelines, it is important to adhere to a DASH Diet of fresh fruits, vegetables, lean meats such as poultry and fish, and whole wheat carbs. 30 minutes of aerobic exercise per day 3-4 times a week, reducing salt intake <1.5g/day, and cutting down on highly processed foods are also shown to reduce high blood pressure. Uncontrolled BP may result in organ damage to your eyes, brain, heart, and kidneys by causing strokes, heart attacks, kidney failure, and bleeds in your eye. Please follow up with your primary care physician within 1-2 weeks after discharge and routinely after for further management.    Diagnosis: Hypothyroidism  Assessment and Plan of Treatment: You have previously been diagnosed with hypothyroidism. We continued your home medication during the hospitalization. Please follow up with your primary doctor within 1-2 weeks of discharge and routinely after for further management.     PRINCIPAL DISCHARGE DIAGNOSIS  Diagnosis: Acute hypoxemic respiratory failure due to COVID-19  Assessment and Plan of Treatment: You came to the hospital complaining of shortness of breath, cough, and fatigue for a few days duration. You were found to be positive for COVID-19. You were given supplemental oxygen and admitted for close monitoring in the ICU. We also started remdesivir and decadron (an antiviral and steroid) per COVID treatment protocol with symotpmatic improvement. You were titrated down to 2L oxygen supplementation on nasal cannula on the medical floor and are now stable for discharge . We have arranged home oxygen for you and are sending you a prescription for xarelto which is a blood thinner to prevent COVID-related blood clots. Please take this medication as prescribed, once a day for 30 days. We are also discharging you on a course of steroids. Please take 4mg for 2 days, (4 pills for 2 days) 2mg for 2 days (2 pills for 2 days), and then 1 mg for 2 days (1 pills for 2 days).  Please continue to isolate when you get home for a total of 10 days since first positive test. Please follow up with your primary doctor within 1-2 weeks of discharge and routinely after. In addition, A CT angio of your chest showed that you may have pulmonary hypertension  It is imperative that you see your cardiologist or Dr. Perez, the cardiolgoist who followed you during hospitalization (details below) for an outpatient echocardiogram for further management.      SECONDARY DISCHARGE DIAGNOSES  Diagnosis: Hypertension  Assessment and Plan of Treatment: You have previously been diagnosed with hypertension (high blood pressure). We managed your blood pressure during your hospitalization with you home medications. Please take your medications on time and as prescribed. Monitor your blood pressure at home, keep a log of your home readings and follow up with your Primary Care Physician. As per AHA/ACC guidelines, it is important to adhere to a DASH Diet of fresh fruits, vegetables, lean meats such as poultry and fish, and whole wheat carbs. 30 minutes of aerobic exercise per day 3-4 times a week, reducing salt intake <1.5g/day, and cutting down on highly processed foods are also shown to reduce high blood pressure. Uncontrolled BP may result in organ damage to your eyes, brain, heart, and kidneys by causing strokes, heart attacks, kidney failure, and bleeds in your eye. Please follow up with your primary care physician within 1-2 weeks after discharge and routinely after for further management.    Diagnosis: Diabetes mellitus  Assessment and Plan of Treatment: You have previously been diagnosed with diabetes mellitus for which you take glipizide, pioglitazone, metformin, and dapaglifozin at home. Your HbA1c at the hospital was 9.0. We stopped your home medication and managed you on sliding scale insulin while you were hospitalized with good glycemic control. We are discharging you on INSULIN LANTUS 18 units AT BEDTIME. CONTINUE THIS ONLY WHILE ON STEROID (dexamethasone). STOP INSULIN ON SATURDAY 12/18.  RESUME YOUR HOME DIABETES PILLS ON SATURDAY 12/8.    We consulted Dr. Lancaster an endocrinologist, who you can follow up with as an outpatient.    Please check your blood sugar levels twice daily - Morning/Afternoon, and Lunch/Bedtime the following day. It is important to keep a record of your blood sugar readings. We recommend you maintain a healthy diet that is low in sugar, carbohydrates and fat. Please limit your intake of high sugar and high carbohydrate foods such as pasta, rice, and bread. Exercise frequently as tolerated. Please follow up with you primary care physician within one week of your discharge to further manage your diabetes and monitor your Hemoglobin A1c levels after 3 months to evaluate your diabetes control.  TAKE INSULIN UNTIL 12/18, then RESUME HOME DIABETES PILLS on 12/18    Diagnosis: Hypothyroidism  Assessment and Plan of Treatment: You have previously been diagnosed with hypothyroidism. We continued your home medication during the hospitalization. Please follow up with your primary doctor within 1-2 weeks of discharge and routinely after for further management.     PRINCIPAL DISCHARGE DIAGNOSIS  Diagnosis: Acute hypoxemic respiratory failure due to COVID-19  Assessment and Plan of Treatment: You came to the hospital complaining of shortness of breath, cough, and fatigue for a few days duration. You were found to be positive for COVID-19. You were given supplemental oxygen and admitted for close monitoring in the ICU. We also started remdesivir and decadron (an antiviral and steroid) per COVID treatment protocol with symotpmatic improvement. You were titrated down to 2L oxygen supplementation on nasal cannula on the medical floor and are now stable for discharge . We have arranged home oxygen for you and are sending you a prescription for xarelto which is a blood thinner to prevent COVID-related blood clots. Please take this medication as prescribed, once a day for 30 days. We are also discharging you on a course of steroids. Please take 4mg for 2 days, (4 pills for 2 days) 2mg for 2 days (2 pills for 2 days), and then 1 mg for 2 days (1 pills for 2 days).  Please continue to isolate when you get home for a total of 10 days since first positive test. Please follow up with your primary doctor within 1-2 weeks of discharge and routinely after. In addition, A CT angio of your chest showed that you may have pulmonary hypertension  It is imperative that you see your cardiologist or Dr. Perez, the cardiolgoist who followed you during hospitalization (details below) for an outpatient echocardiogram for further management. CONTINUE INSULIN FOR 6 DAYS TO COMPLETE ON 12/17 LAST DOSE FRIDAY EVENING - BECAUSE YOUR STEROID MEDICINE RAISES YOUR BLOOD SUGAR.      SECONDARY DISCHARGE DIAGNOSES  Diagnosis: Hypertension  Assessment and Plan of Treatment: You have previously been diagnosed with hypertension (high blood pressure). We managed your blood pressure during your hospitalization with you home medications. Please take your medications on time and as prescribed. Monitor your blood pressure at home, keep a log of your home readings and follow up with your Primary Care Physician. As per AHA/ACC guidelines, it is important to adhere to a DASH Diet of fresh fruits, vegetables, lean meats such as poultry and fish, and whole wheat carbs. 30 minutes of aerobic exercise per day 3-4 times a week, reducing salt intake <1.5g/day, and cutting down on highly processed foods are also shown to reduce high blood pressure. Uncontrolled BP may result in organ damage to your eyes, brain, heart, and kidneys by causing strokes, heart attacks, kidney failure, and bleeds in your eye. Please follow up with your primary care physician within 1-2 weeks after discharge and routinely after for further management.    Diagnosis: Diabetes mellitus  Assessment and Plan of Treatment: You have previously been diagnosed with diabetes mellitus for which you take glipizide, pioglitazone, metformin, and dapaglifozin at home. Your HbA1c at the hospital was 9.0. We stopped your home medication and managed you on sliding scale insulin while you were hospitalized with good glycemic control. We are discharging you on INSULIN LANTUS 18 units AT BEDTIME. CONTINUE THIS ONLY WHILE ON STEROID (dexamethasone). STOP INSULIN ON FRIDAY 12/17.  RESUME YOUR HOME DIABETES PILLS ON SUNDAY 12/19.    We consulted Dr. Lancaster an endocrinologist, who you can follow up with as an outpatient.    Please check your blood sugar levels twice daily - Morning/Afternoon, and Lunch/Bedtime the following day. It is important to keep a record of your blood sugar readings. We recommend you maintain a healthy diet that is low in sugar, carbohydrates and fat. Please limit your intake of high sugar and high carbohydrate foods such as pasta, rice, and bread. Exercise frequently as tolerated. Please follow up with you primary care physician within one week of your discharge to further manage your diabetes and monitor your Hemoglobin A1c levels after 3 months to evaluate your diabetes control.  TAKE INSULIN UNTIL 12/17 FRIDAY EVENING, then RESUME HOME DIABETES PILLS on 12/19 SUNDAY. DO NOT TAKE YOUR PILLS ON SATURDAY AND DO NOT USE INSULIN SATURDAY NIGHT.    Diagnosis: Hypothyroidism  Assessment and Plan of Treatment: You have previously been diagnosed with hypothyroidism. We continued your home medication during the hospitalization. Please follow up with your primary doctor within 1-2 weeks of discharge and routinely after for further management.

## 2021-12-08 NOTE — PROGRESS NOTE ADULT - SUBJECTIVE AND OBJECTIVE BOX
PGY-1 Progress Note discussed with attending    PAGER #: [[phone]] TILL 5:00 PM  PLEASE CONTACT ON CALL TEAM:  - On Call Team (Please refer to Edna) FROM 5:00 PM - 8:30PM  - Nightfloat Team FROM 8:30 -7:30 AM    CHIEF COMPLAINT & BRIEF HOSPITAL COURSE:  66 year old female from home, lives alone, has past medical hx of hypertension, DM, hypothyroidism came to ED with complains of shortness of breath for 2 days that worsened today associated with dry cough and fatigue. As per patient, generalized weakness started about 4 days ago associated to tiredness, poor appetite and low energy. Patient denies any fever, chills, sick contacts, nausea, vomiting, diarrhea, bleeding, chest pain or palpitations. Of note patient was vaccinated on April with [employer]  (03 Dec 2021 20:55)  Patient noted to saturating in 70s in ED on RA , was placed on NRB 15liters with improvement to 93%, Patient noted to desaturate  with minimal exertions to low 90s with wob , ICU consulted for close monitoring.  ICU Course: Patient was started on Decadron and Remdesivir. Her O2 saturations improved. she was transitioned to nasal cannula. Patient noted to be saturating above 90% transferred to medical floor for further management.     INTERVAL HPI/OVERNIGHT EVENTS:   Patient seen and examined at bedside. No overnight events. Patient reports general improvement but complains of SOB on exertion. Finished remdesivir this AM. Lowered to 3L NC, c/w O2 titrations as tolerated.    REVIEW OF SYSTEMS:  CONSTITUTIONAL: No fever, night sweats, weight loss, or fatigue  RESPIRATORY: No cough, wheezing, or hemoptysis; +shortness of breath on exertion  CARDIOVASCULAR: No chest pain, palpitations, dizziness, or leg swelling  GASTROINTESTINAL: No abdominal pain. No nausea, vomiting, or hematemesis; No diarrhea or constipation. No melena or hematochezia.  GENITOURINARY: No dysuria or hematuria, no urinary frequency  NEUROLOGICAL: No headaches, memory loss, loss of strength, numbness, or tremors  SKIN: No itching, burning, rashes, or lesions     Vital Signs Last 24 Hrs  T(C): 36.7 (08 Dec 2021 07:51), Max: 36.7 (08 Dec 2021 00:20)  T(F): 98 (08 Dec 2021 07:51), Max: 98 (08 Dec 2021 00:20)  HR: 71 (08 Dec 2021 07:51) (67 - 78)  BP: 129/66 (08 Dec 2021 07:51) (124/70 - 138/81)  BP(mean): --  RR: 18 (08 Dec 2021 07:51) (18 - 20)  SpO2: 96% (08 Dec 2021 07:51) (95% - 99%)    PHYSICAL EXAMINATION:  GENERAL: NAD, well built  HEAD:  Atraumatic, Normocephalic  EYES:  conjunctiva and sclera clear  NECK: Supple, No JVD, thyroid not examined   CHEST/LUNG: Clear to auscultation. No wheezing, rales, rubs or rhonchi  HEART: Regular rate and rhythm; Clear S1 and S2, No murmurs, rubs, or gallops  ABDOMEN: Soft, Nontender, Nondistended; Bowel sounds present  NERVOUS SYSTEM:  Alert & Oriented X3, CNII-XII intact, no focal neurological deficits   EXTREMITIES:  2+ Peripheral Pulses, No clubbing, cyanosis, or edema  SKIN: warm dry, no lesions noted                           12.7   7.19  )-----------( 337      ( 08 Dec 2021 07:06 )             38.0     12-08    137  |  101  |  14  ----------------------------<  275<H>  4.1   |  30  |  0.74    Ca    9.4      08 Dec 2021 07:06  Phos  2.3     12-08  Mg     2.1     12-08    TPro  6.4  /  Alb  2.0<L>  /  TBili  0.4  /  DBili  0.1  /  AST  16  /  ALT  19  /  AlkPhos  64  12-08    LIVER FUNCTIONS - ( 08 Dec 2021 07:06 )  Alb: 2.0 g/dL / Pro: 6.4 g/dL / ALK PHOS: 64 U/L / ALT: 19 U/L DA / AST: 16 U/L / GGT: x           CARDIAC MARKERS ( 07 Dec 2021 08:08 )  x     / x     / 27 U/L / x     / x          PT/INR - ( 08 Dec 2021 07:06 )   PT: 14.6 sec;   INR: 1.24 ratio          CAPILLARY BLOOD GLUCOSE      RADIOLOGY & ADDITIONAL TESTS:    EXAM:  CT ANGIO CHEST PULM ART [facility]                            PROCEDURE DATE:  12/06/2021          INTERPRETATION:  CLINICAL HISTORY: Covid, assess for PE    TECHNIQUE: A volumetric acquisition of the chest was obtained from the thoracic inlet tothe upper abdomen during dynamic administration of intravenous contrast. 3D MIP images were provided.    COMPARISON: No prior chest CT scan available for comparison    FINDINGS:    CTA: The study is technically adequate with a good contrast bolus to the pulmonary arteries. There are no filling defects in the pulmonary artery or its branches. The heart is normal in size. The aorta is normal in caliber. The pulmonary artery is larger than the adjacent ascending aorta. No pericardial effusion.    Lungs/Airways/Pleura: There is lower lobe predominant peripheral and peribronchovascular groundglass and consolidation throughout both lungs. There is bronchial dilatation with areas of abnormal lung parenchyma and there is lower lobe volume loss. Thereare trace left greater than right pleural effusions.    Mediastinum/Lymph nodes: No thoracic adenopathy. There is a small hiatal hernia.    Upper Abdomen: Cholecystectomy has been performed. There is a calcification in the right kidney.    Bones and Soft Tissues: No aggressive osseous lesions.    IMPRESSION:    1.  No pulmonary thromboembolism    2.  Lung findings typically seen in COVID 19 infection    3.  Enlarged pulmonary artery compared to adjacent ascending aorta suggestive of pulmonary hypertension. Correlation with echocardiogram is recommended.    --- End of Report ---       CARMEN GRIFFIN M.D., Attending Radiologist  This document has been electronically signed. Dec  6 2021  1:06PM

## 2021-12-08 NOTE — DISCHARGE NOTE PROVIDER - NSDCMRMEDTOKEN_GEN_ALL_CORE_FT
alogliptin-metformin 12.5 mg-1000 mg oral tablet: 1 tab(s) orally 2 times a day  amLODIPine 10 mg oral tablet: 1 tab(s) orally once a day  dapagliflozin 10 mg oral tablet: 1 tab(s) orally once a day  glipiZIDE 10 mg oral tablet: 1 tab(s) orally once a day  losartan 50 mg oral tablet: 1 tab(s) orally once a day  Metoprolol Succinate ER 25 mg oral tablet, extended release: 1 tab(s) orally once a day  pioglitazone 30 mg oral tablet: 1 tab(s) orally once a day  Synthroid 88 mcg (0.088 mg) oral tablet: 1 tab(s) orally once a day   alcohol swabs : Apply topically to affected area 4 times a day   alogliptin-metformin 12.5 mg-1000 mg oral tablet: 1 tab(s) orally 2 times a day  amLODIPine 10 mg oral tablet: 1 tab(s) orally once a day  dapagliflozin 10 mg oral tablet: 1 tab(s) orally once a day  dexamethasone 1 mg oral tablet: 4 tab(s) orally once a day x 2 days  2 tab(s) orally once a day x 2 days  1 tab(s) orally once a day x 2 days   glipiZIDE 10 mg oral tablet: 1 tab(s) orally once a day  glucometer (per patient&#x27;s insurance): Test blood sugars four times a day. Dispense #1 glucometer.  Insulin Pen Needles, 4mm: 1 application subcutaneously 4 times a day. ** Use with insulin pen **   lancets: 1 application subcutaneously 4 times a day   Lantus Solostar Pen 100 units/mL subcutaneous solution: 18 unit(s) subcutaneous once a day (at bedtime)   losartan 50 mg oral tablet: 1 tab(s) orally once a day  Metoprolol Succinate ER 25 mg oral tablet, extended release: 1 tab(s) orally once a day  pioglitazone 30 mg oral tablet: 1 tab(s) orally once a day  Synthroid 88 mcg (0.088 mg) oral tablet: 1 tab(s) orally once a day  test strips (per patient&#x27;s insurance): 1 application subcutaneously 4 times a day. ** Compatible with patient&#x27;s glucometer **  Xarelto 10 mg oral tablet: 1 tab(s) orally once a day    alcohol swabs : Apply topically to affected area 4 times a day   alogliptin-metformin 12.5 mg-1000 mg oral tablet: 1 tab(s) orally 2 times a day  RESUME ON SATURDAY 12/18  amLODIPine 10 mg oral tablet: 1 tab(s) orally once a day  dapagliflozin 10 mg oral tablet: 1 tab(s) orally once a day  RESUME ON SATURDAY 12/18  dexamethasone 1 mg oral tablet: 4 tab(s) orally once a day x 2 days  2 tab(s) orally once a day x 2 days  1 tab(s) orally once a day x 2 days   glipiZIDE 10 mg oral tablet: 1 tab(s) orally once a day  RESUME ON SATURDAY 12/18  glucometer (per patient&#x27;s insurance): Test blood sugars four times a day. Dispense #1 glucometer.  Insulin Pen Needles, 4mm: 1 application subcutaneously 4 times a day. ** Use with insulin pen **   lancets: 1 application subcutaneously 4 times a day   Lantus Solostar Pen 100 units/mL subcutaneous solution: 18 unit(s) subcutaneous once a day (at bedtime)   DISCONTINUE - AFTER RESUMING DIABETES MEDICINE ON SATURDAY 12/18  losartan 50 mg oral tablet: 1 tab(s) orally once a day  Metoprolol Succinate ER 25 mg oral tablet, extended release: 1 tab(s) orally once a day  pioglitazone 30 mg oral tablet: 1 tab(s) orally once a day  RESUME ON SATURDAY 12/18  Synthroid 88 mcg (0.088 mg) oral tablet: 1 tab(s) orally once a day  test strips (per patient&#x27;s insurance): 1 application subcutaneously 4 times a day. ** Compatible with patient&#x27;s glucometer **  Xarelto 10 mg oral tablet: 1 tab(s) orally once a day    alcohol swabs : Apply topically to affected area 4 times a day   alogliptin-metformin 12.5 mg-1000 mg oral tablet: 1 tab(s) orally 2 times a day  RESUME ON SUNDAY 12/19  amLODIPine 10 mg oral tablet: 1 tab(s) orally once a day  Basaglar KwikPen 100 units/mL subcutaneous solution: 18 unit(s) subcutaneous once a day (at bedtime) x 6 days - STOP INSULIN ON FRIDAY 12/17  dapagliflozin 10 mg oral tablet: 1 tab(s) orally once a day  RESUME ON SUNDAY 12/19  dexamethasone 1 mg oral tablet: 4 tab(s) orally once a day x 2 days  2 tab(s) orally once a day x 2 days  1 tab(s) orally once a day x 2 days   glipiZIDE 10 mg oral tablet: 1 tab(s) orally once a day  RESUME ON SUNDAY 12/19  glucometer (per patient&#x27;s insurance): Test blood sugars four times a day. Dispense #1 glucometer.  Insulin Pen Needles, 4mm: 1 application subcutaneously 4 times a day. ** Use with insulin pen **   lancets: 1 application subcutaneously 4 times a day   losartan 50 mg oral tablet: 1 tab(s) orally once a day  Metoprolol Succinate ER 25 mg oral tablet, extended release: 1 tab(s) orally once a day  pioglitazone 30 mg oral tablet: 1 tab(s) orally once a day  RESUME ON SUNDAY 12/19  Synthroid 88 mcg (0.088 mg) oral tablet: 1 tab(s) orally once a day  test strips (per patient&#x27;s insurance): 1 application subcutaneously 4 times a day. ** Compatible with patient&#x27;s glucometer **  Xarelto 10 mg oral tablet: 1 tab(s) orally once a day

## 2021-12-08 NOTE — DISCHARGE NOTE PROVIDER - CARE PROVIDER_API CALL
Imtiaz Carter  Phone: [phone]  Fax: (   )    -  Follow Up Time:     Russell Perez)  Cardiovascular Disease  [facility] [address]  Phone: [phone]  Fax: [phone]  Follow Up Time:    Russell Perez)  Cardiovascular Disease  [address] [facility] [address]  [place] [address]  Phone: [phone]  Fax: [phone]  Follow Up Time:     Imtiaz Carter  Phone: [phone]  Fax: (   )    -  Follow Up Time:     Malaika Lancaster)  EndocrinologyMetabDiabetes  [address] [place] [address]  Phone: [phone]  Fax: [phone]  Follow Up Time: 1 week

## 2021-12-08 NOTE — PROGRESS NOTE ADULT - SUBJECTIVE AND OBJECTIVE BOX
C A R D I O L O G Y  **********************************     DATE OF SERVICE: 12-08-21    Patient denies chest pain  breathing is comfortable on NC   Review of systems otherwise (-)  	  MEDICATIONS:  MEDICATIONS  (STANDING):  chlorhexidine 2% Cloths 1 Application(s) Topical <User Schedule>  dexAMETHasone  Injectable 6 milliGRAM(s) IV Push daily  enoxaparin Injectable 40 milliGRAM(s) SubCutaneous daily  glucagon  Injectable 1 milliGRAM(s) IntraMuscular once  influenza  Vaccine (HIGH DOSE) 0.7 milliLiter(s) IntraMuscular once  insulin glargine Injectable (LANTUS) 10 Unit(s) SubCutaneous at bedtime  insulin lispro (ADMELOG) corrective regimen sliding scale   SubCutaneous three times a day before meals  insulin lispro (ADMELOG) corrective regimen sliding scale   SubCutaneous at bedtime  levothyroxine 88 MICROGram(s) Oral daily  metoprolol succinate ER 25 milliGRAM(s) Oral daily  pantoprazole    Tablet 40 milliGRAM(s) Oral before breakfast  polyethylene glycol 3350 17 Gram(s) Oral daily  senna 2 Tablet(s) Oral at bedtime      LABS:	 	    CARDIAC MARKERS:  CARDIAC MARKERS ( 07 Dec 2021 08:08 )  x     / x     / 27 U/L / x     / x                                        12.7   7.19  )-----------( 337      ( 08 Dec 2021 07:06 )             38.0     Hemoglobin: 12.7 g/dL (12-08 @ 07:06)  Hemoglobin: 13.0 g/dL (12-07 @ 08:08)  Hemoglobin: 12.7 g/dL (12-06 @ 06:45)  Hemoglobin: 13.0 g/dL (12-05 @ 06:21)  Hemoglobin: 12.6 g/dL (12-04 @ 05:50)      12-08    137  |  101  |  14  ----------------------------<  275<H>  4.1   |  30  |  0.74    Ca    9.4      08 Dec 2021 07:06  Phos  2.3     12-08  Mg     2.1     12-08    TPro  6.4  /  Alb  2.0<L>  /  TBili  0.4  /  DBili  0.1  /  AST  16  /  ALT  19  /  AlkPhos  64  12-08    Creatinine Trend: 0.74<--, 0.78<--, 0.98<--, 0.93<--, 0.80<--, 1.04<--    COAGS:   PT/INR - ( 08 Dec 2021 07:06 )   PT: 14.6 sec;   INR: 1.24 ratio           PHYSICAL EXAM:  T(C): 36.5 (12-08-21 @ 11:05), Max: 36.7 (12-08-21 @ 00:20)  HR: 75 (12-08-21 @ 11:05) (67 - 78)  BP: 104/57 (12-08-21 @ 11:05) (104/57 - 135/73)  RR: 18 (12-08-21 @ 11:05) (18 - 19)  SpO2: 94% (12-08-21 @ 11:05) (94% - 99%)  Wt(kg): --  I&O's Summary    07 Dec 2021 07:01  -  08 Dec 2021 07:00  --------------------------------------------------------  IN: 200 mL / OUT: 0 mL / NET: 200 mL    08 Dec 2021 07:01  -  08 Dec 2021 14:09  --------------------------------------------------------  IN: 450 mL / OUT: 0 mL / NET: 450 mL      HEENT:  (-)icterus (-)pallor  CV: N S1 S2 1/6 JUANCARLOS (+)2 Pulses B/l  Resp:  Clear to ausculatation B/L, normal effort  GI: (+) BS Soft, NT, ND  Lymph:  (-)Edema, (-)obvious lymphadenopathy  Skin: Warm to touch, Normal turgor  Psych: Appropriate mood and affect      TELEMETRY: 	  OFF        ASSESSMENT/PLAN: 	66y  Female past medical hx of hypertension, DM, hypothyroidism came to ED c/o shortness of breath for 2 days that worsened today associated to dry cough and fatigue found to be covid (+) and incidentally noted with enlarged PA on CTA concerning for elevated PA pressures.  (-) PE    - low BNP and no strain pattern on EKG is reassuring   - Outpt echo once acute covid infection resolves and off airborne  - Pulm F/u  - treatment of COVID per primary team    Russell Perez MD, [facility]  BEEPER [phone]

## 2021-12-09 LAB
ALBUMIN SERPL ELPH-MCNC: 2.2 G/DL — LOW (ref 3.5–5)
ALP SERPL-CCNC: 66 U/L — SIGNIFICANT CHANGE UP (ref 40–120)
ALT FLD-CCNC: 29 U/L DA — SIGNIFICANT CHANGE UP (ref 10–60)
ANION GAP SERPL CALC-SCNC: 4 MMOL/L — LOW (ref 5–17)
AST SERPL-CCNC: 21 U/L — SIGNIFICANT CHANGE UP (ref 10–40)
BILIRUB DIRECT SERPL-MCNC: 0.1 MG/DL — SIGNIFICANT CHANGE UP (ref 0–0.3)
BILIRUB INDIRECT FLD-MCNC: 0.3 MG/DL — SIGNIFICANT CHANGE UP (ref 0.2–1)
BILIRUB SERPL-MCNC: 0.4 MG/DL — SIGNIFICANT CHANGE UP (ref 0.2–1.2)
BUN SERPL-MCNC: 12 MG/DL — SIGNIFICANT CHANGE UP (ref 7–18)
CALCIUM SERPL-MCNC: 9.6 MG/DL — SIGNIFICANT CHANGE UP (ref 8.4–10.5)
CHLORIDE SERPL-SCNC: 98 MMOL/L — SIGNIFICANT CHANGE UP (ref 96–108)
CO2 SERPL-SCNC: 31 MMOL/L — SIGNIFICANT CHANGE UP (ref 22–31)
CREAT SERPL-MCNC: 0.67 MG/DL — SIGNIFICANT CHANGE UP (ref 0.5–1.3)
GLUCOSE BLDC GLUCOMTR-MCNC: 265 MG/DL — HIGH (ref 70–99)
GLUCOSE BLDC GLUCOMTR-MCNC: 305 MG/DL — HIGH (ref 70–99)
GLUCOSE BLDC GLUCOMTR-MCNC: 317 MG/DL — HIGH (ref 70–99)
GLUCOSE BLDC GLUCOMTR-MCNC: 385 MG/DL — HIGH (ref 70–99)
GLUCOSE SERPL-MCNC: 260 MG/DL — HIGH (ref 70–99)
HCT VFR BLD CALC: 39 % — SIGNIFICANT CHANGE UP (ref 34.5–45)
HGB BLD-MCNC: 12.9 G/DL — SIGNIFICANT CHANGE UP (ref 11.5–15.5)
INR BLD: 1.3 RATIO — HIGH (ref 0.88–1.16)
MAGNESIUM SERPL-MCNC: 2 MG/DL — SIGNIFICANT CHANGE UP (ref 1.6–2.6)
MCHC RBC-ENTMCNC: 28.8 PG — SIGNIFICANT CHANGE UP (ref 27–34)
MCHC RBC-ENTMCNC: 33.1 GM/DL — SIGNIFICANT CHANGE UP (ref 32–36)
MCV RBC AUTO: 87.1 FL — SIGNIFICANT CHANGE UP (ref 80–100)
NRBC # BLD: 0 /100 WBCS — SIGNIFICANT CHANGE UP (ref 0–0)
PHOSPHATE SERPL-MCNC: 2.5 MG/DL — SIGNIFICANT CHANGE UP (ref 2.5–4.5)
PLATELET # BLD AUTO: 354 K/UL — SIGNIFICANT CHANGE UP (ref 150–400)
POTASSIUM SERPL-MCNC: 3.8 MMOL/L — SIGNIFICANT CHANGE UP (ref 3.5–5.3)
POTASSIUM SERPL-SCNC: 3.8 MMOL/L — SIGNIFICANT CHANGE UP (ref 3.5–5.3)
PROT SERPL-MCNC: 6.6 G/DL — SIGNIFICANT CHANGE UP (ref 6–8.3)
PROTHROM AB SERPL-ACNC: 15.3 SEC — HIGH (ref 10.6–13.6)
RBC # BLD: 4.48 M/UL — SIGNIFICANT CHANGE UP (ref 3.8–5.2)
RBC # FLD: 12.1 % — SIGNIFICANT CHANGE UP (ref 10.3–14.5)
SODIUM SERPL-SCNC: 133 MMOL/L — LOW (ref 135–145)
WBC # BLD: 8.72 K/UL — SIGNIFICANT CHANGE UP (ref 3.8–10.5)
WBC # FLD AUTO: 8.72 K/UL — SIGNIFICANT CHANGE UP (ref 3.8–10.5)

## 2021-12-09 RX ORDER — INSULIN GLARGINE 100 [IU]/ML
18 INJECTION, SOLUTION SUBCUTANEOUS AT BEDTIME
Refills: 0 | Status: DISCONTINUED | OUTPATIENT
Start: 2021-12-09 | End: 2021-12-12

## 2021-12-09 RX ADMIN — SENNA PLUS 2 TABLET(S): 8.6 TABLET ORAL at 22:36

## 2021-12-09 RX ADMIN — PANTOPRAZOLE SODIUM 40 MILLIGRAM(S): 20 TABLET, DELAYED RELEASE ORAL at 06:29

## 2021-12-09 RX ADMIN — Medication 88 MICROGRAM(S): at 06:29

## 2021-12-09 RX ADMIN — INSULIN GLARGINE 18 UNIT(S): 100 INJECTION, SOLUTION SUBCUTANEOUS at 22:32

## 2021-12-09 RX ADMIN — Medication 5: at 17:23

## 2021-12-09 RX ADMIN — Medication 3 UNIT(S): at 12:18

## 2021-12-09 RX ADMIN — Medication 3 UNIT(S): at 17:23

## 2021-12-09 RX ADMIN — Medication 3: at 08:04

## 2021-12-09 RX ADMIN — Medication 25 MILLIGRAM(S): at 06:29

## 2021-12-09 RX ADMIN — ENOXAPARIN SODIUM 40 MILLIGRAM(S): 100 INJECTION SUBCUTANEOUS at 12:19

## 2021-12-09 RX ADMIN — POLYETHYLENE GLYCOL 3350 17 GRAM(S): 17 POWDER, FOR SOLUTION ORAL at 12:22

## 2021-12-09 RX ADMIN — Medication 4: at 12:19

## 2021-12-09 RX ADMIN — Medication 2: at 22:34

## 2021-12-09 RX ADMIN — Medication 3 UNIT(S): at 08:04

## 2021-12-09 RX ADMIN — Medication 6 MILLIGRAM(S): at 06:29

## 2021-12-09 NOTE — PROGRESS NOTE ADULT - PROBLEM SELECTOR PLAN 3
on glipizide, pioglitazone, alogliptin metformin, dapagliflozin at home  Holding home po medications  - Started on HSS  - Fingerstick before meals and at bedtime  - DASH diet with consistent carb  - Target -180  - A1c 9.0  - target CBG < 180  c/w 18u lantus + 3u admelog tid   c/w ssi

## 2021-12-09 NOTE — DIETITIAN INITIAL EVALUATION ADULT. - FACTORS AFF FOOD INTAKE
acute on chronic comorbidities including Covid19 infection, AHRF, uncontrolled DM/[employer]/ethnic/cultural/personal food preferences

## 2021-12-09 NOTE — DIETITIAN INITIAL EVALUATION ADULT. - PROBLEM SELECTOR PLAN 2
- Patient has history of Hypertension on amlodipine, metoprolol, losartan at home   - Hold meds for now as patient is normotensive   - DASH diet  - Monitor BP and resume meds as needed plan per MD

## 2021-12-09 NOTE — DIETITIAN INITIAL EVALUATION ADULT. - PROBLEM SELECTOR PLAN 4
- Patient with hx of hypothyroidism on Synthroid at home   - C/w home meds   - F/u TSH and adjust medications if needed plan per MD

## 2021-12-09 NOTE — DIETITIAN INITIAL EVALUATION ADULT. - PERTINENT LABORATORY DATA
12-09 Na133 mmol/L<L> Glu 260 mg/dL<H> K+ 3.8 mmol/L Cr  0.67 mg/dL BUN 12 mg/dL   12-09 Phos 2.5 mg/dL   12-09 Alb 2.2 g/dL<L>        12-06-21 @ 13:25 HgbA1C 9.0 [4.0 - 5.6]

## 2021-12-09 NOTE — DIETITIAN INITIAL EVALUATION ADULT. - PROBLEM SELECTOR PLAN 3
- Patient has hx of DM on glipizide, pioglitazone, alogliptin metformin, dapagliflozin at home  - Holding home po medications  - Started on HSS  - Fingerstick before meals and at bedtime  - DASH diet with consistent carb  - Target -180  - F/u A1c plan per MD

## 2021-12-09 NOTE — PROGRESS NOTE ADULT - ATTENDING COMMENTS
HPI:    66 year old female from home, lives alone, has past medical hx of hypertension, DM, hypothyroidism came to ED c/o shortness of breath for 2 days that worsened today associated to dry cough and fatigue. As per patient, generalized weakness started about 4 days ago associated to tiredness, poor appetite and low energy. Patient denies any fever, chills, sick contacts, nausea, vomiting, diarrhea, bleeding, chest pain or palpitations. Of note patient was vaccinated on April with [employer]  (03 Dec 2021 20:55)    - HYPOXIC RESPIRATORY FAILURE DUE TO COVID 19 PNEUMONIA - S/P IV REMDESEVIR AND ON DECADRON ( ON 12/03/21 ) & INJ. LOVENOX PROPHYLAXIS ,  O2 SUPPLEMENTS. PATIENT WAS MONITORED IN ICU . PATIENT IS SEEN IMPROVING CLINICALLY ON CURRENT MANAGEMENT   -  CTA CHEST NEGATIVE FOR PE, NOTED TO HAVE INFILTRATE AND CHANGES SUSPICIOUS FOR PULM HTN  - PULMONARY CONSULT AND CARDIOLOGY CONSULT   - RECOMMENDATION FOR OUTPATIENT ECHOCARDIOGRAM POST-COVID   - ENCOURAGED FOR PRONE-POSITIONING AS TOLERATED    - DOWN GRADE FROM ICU TEAM  TO FLOOR AFTER IMPROVING HYPOXIC RESPIRATORY FAILURE - TOLERATING WELL ON NASAL CANULA     # HYPERGLYCEMIA S/T STEROIDS, W/ UNDERLYING DM - TITRATING LANTUS, SSI + FS  - NOTED HBA1C IS 9    - ISOLATION DROPLET     - DM TYPE 2, HYPOTHYROIDISM, OBESITY   - GI AND DVT PROPHYLAXIS .

## 2021-12-09 NOTE — DIETITIAN INITIAL EVALUATION ADULT. - PROBLEM SELECTOR PLAN 1
- On admission, patient was c/o cough, sob, sat 77% on RA and 93-96% on NRB 15L  - CXR showed bilateral opacities  - EKG NSR, troponin x1 neg  - COVID19 PCR +ve  - F/u COVID19 AB, LDH, Procalcitonin, ferritin, D-Dimer, CRP every 3 days   - F/u Blood cultures, urine Legionella, Strep antigen, Mycoplasma antigen   - Tylenol, Albuterol Inhaler, Robitussin PRN  - Monitor respiratory status   - Started on Decadron 6 mg IV daily for 10 days and Remdesivir   - C/w O2 supplementation to keep O2 sat >92%  - F/u CTA chest to r/o PE  - Monitor for hemodynamic instability   - Isolation precautions plan per MD

## 2021-12-09 NOTE — PROGRESS NOTE ADULT - SUBJECTIVE AND OBJECTIVE BOX
PGY-1 Progress Note discussed with attending    PAGER #: [[phone]] TILL 5:00 PM  PLEASE CONTACT ON CALL TEAM:  - On Call Team (Please refer to Edna) FROM 5:00 PM - 8:30PM  - Nightfloat Team FROM 8:30 -7:30 AM    CHIEF COMPLAINT & BRIEF HOSPITAL COURSE:  66 year old female from home, lives alone, has past medical hx of hypertension, DM, hypothyroidism came to ED with complains of shortness of breath for 2 days that worsened today associated with dry cough and fatigue. As per patient, generalized weakness started about 4 days ago associated to tiredness, poor appetite and low energy. Patient denies any fever, chills, sick contacts, nausea, vomiting, diarrhea, bleeding, chest pain or palpitations. Of note patient was vaccinated on April with [employer]  (03 Dec 2021 20:55)  Patient noted to saturating in 70s in ED on RA , was placed on NRB 15liters with improvement to 93%, Patient noted to desaturate  with minimal exertions to low 90s with wob , ICU consulted for close monitoring.  ICU Course: Patient was started on Decadron and Remdesivir. Her O2 saturations improved. she was transitioned to nasal cannula. Patient noted to be saturating above 90% transferred to medical floor for further management.     INTERVAL HPI/OVERNIGHT EVENTS:   Patient seen and examined at bedside. No overnight events. Patient reports general improvement but complains of SOB on exertion. Yesterday was 88% on O2 ambulation on RA and restarted back on 4L. Qualifies for home O2but hesitant d/t home status. c/w O2 titration as tolerated      REVIEW OF SYSTEMS:  CONSTITUTIONAL: No fever, night sweats, weight loss, or fatigue  RESPIRATORY: No cough, wheezing, or hemoptysis; +shortness of breath on exertion  CARDIOVASCULAR: No chest pain, palpitations, dizziness, or leg swelling  GASTROINTESTINAL: No abdominal pain. No nausea, vomiting, or hematemesis; No diarrhea or constipation. No melena or hematochezia.  GENITOURINARY: No dysuria or hematuria, no urinary frequency  NEUROLOGICAL: No headaches, memory loss, loss of strength, numbness, or tremors  SKIN: No itching, burning, rashes, or lesions     Vital Signs Last 24 Hrs  T(C): 36.7 (09 Dec 2021 10:27), Max: 37.1 (08 Dec 2021 23:54)  T(F): 98 (09 Dec 2021 10:27), Max: 98.7 (08 Dec 2021 23:54)  HR: 89 (09 Dec 2021 10:27) (67 - 89)  BP: 108/92 (09 Dec 2021 10:27) (108/92 - 147/83)  BP(mean): --  RR: 20 (09 Dec 2021 10:27) (18 - 20)  SpO2: 97% (09 Dec 2021 10:27) (84% - 98%)    PHYSICAL EXAMINATION:  GENERAL: NAD, well built  HEAD:  Atraumatic, Normocephalic  EYES:  conjunctiva and sclera clear  NECK: Supple, No JVD, thyroid not examined   CHEST/LUNG: Clear to auscultation. No wheezing, rales, rubs or rhonchi  HEART: Regular rate and rhythm; Clear S1 and S2, No murmurs, rubs, or gallops  ABDOMEN: Soft, Nontender, Nondistended; Bowel sounds present  NERVOUS SYSTEM:  Alert & Oriented X3, CNII-XII intact, no focal neurological deficits   EXTREMITIES:  2+ Peripheral Pulses, No clubbing, cyanosis, or edema  SKIN: warm dry, no lesions noted                           12.9   8.72  )-----------( 354      ( 09 Dec 2021 07:54 )             39.0     12-09    133<L>  |  98  |  12  ----------------------------<  260<H>  3.8   |  31  |  0.67    Ca    9.6      09 Dec 2021 07:54  Phos  2.5     12-09  Mg     2.0     12-09    TPro  6.6  /  Alb  2.2<L>  /  TBili  0.4  /  DBili  0.1  /  AST  21  /  ALT  29  /  AlkPhos  66  12-09    LIVER FUNCTIONS - ( 09 Dec 2021 07:55 )  Alb: 2.2 g/dL / Pro: 6.6 g/dL / ALK PHOS: 66 U/L / ALT: 29 U/L DA / AST: 21 U/L / GGT: x               PT/INR - ( 09 Dec 2021 07:54 )   PT: 15.3 sec;   INR: 1.30 ratio        CAPILLARY BLOOD GLUCOSE      RADIOLOGY & ADDITIONAL TESTS:    EXAM:  CT ANGIO CHEST PULM ART [facility]                            PROCEDURE DATE:  12/06/2021          INTERPRETATION:  CLINICAL HISTORY: Covid, assess for PE    TECHNIQUE: A volumetric acquisition of the chest was obtained from the thoracic inlet tothe upper abdomen during dynamic administration of intravenous contrast. 3D MIP images were provided.    COMPARISON: No prior chest CT scan available for comparison    FINDINGS:    CTA: The study is technically adequate with a good contrast bolus to the pulmonary arteries. There are no filling defects in the pulmonary artery or its branches. The heart is normal in size. The aorta is normal in caliber. The pulmonary artery is larger than the adjacent ascending aorta. No pericardial effusion.    Lungs/Airways/Pleura: There is lower lobe predominant peripheral and peribronchovascular groundglass and consolidation throughout both lungs. There is bronchial dilatation with areas of abnormal lung parenchyma and there is lower lobe volume loss. Thereare trace left greater than right pleural effusions.    Mediastinum/Lymph nodes: No thoracic adenopathy. There is a small hiatal hernia.    Upper Abdomen: Cholecystectomy has been performed. There is a calcification in the right kidney.    Bones and Soft Tissues: No aggressive osseous lesions.    IMPRESSION:    1.  No pulmonary thromboembolism    2.  Lung findings typically seen in COVID 19 infection    3.  Enlarged pulmonary artery compared to adjacent ascending aorta suggestive of pulmonary hypertension. Correlation with echocardiogram is recommended.    --- End of Report ---       CARMEN GRIFFIN M.D., Attending Radiologist  This document has been electronically signed. Dec  6 2021  1:06PM

## 2021-12-09 NOTE — PROGRESS NOTE ADULT - PROBLEM SELECTOR PLAN 1
CXR b/l multifocal pneumonia  CTA:  1.  No PE  2.  Lung findings typically seen in COVID 19 infection  3.  Enlarged pulmonary artery compared to adjacent ascending aorta suggestive of pulmonary hypertension. Correlation with echocardiogram is recommended.  Echo as outpatient   - started on Albuterol and ipratropium MDI   - continue to trend d-dimer, CRP, LDH, Troponin, Ferritin, CPK  - Tylenol PRN for fever  - Monitor for hemodynamic instability  - s/p Remdesivir (5/5 days)  - c/w isolation precautions.  - c/w decadron   - c/w O2 supplementation, titrate down as tolerated (on 3L 12/9)  88% on RA ambulation, qualifies for home O2 but hesitant as lives alone and unsure about compliance.   Cardio Ana consulted

## 2021-12-09 NOTE — PROGRESS NOTE ADULT - SUBJECTIVE AND OBJECTIVE BOX
C A R D I O L O G Y  **********************************     DATE OF SERVICE: 12-09-21    Patient denies chest pain or shortness of breath.   Review of symptoms otherwise negative.    acetaminophen     Tablet .. 650 milliGRAM(s) Oral every 6 hours PRN  ALBUTerol    90 MICROgram(s) HFA Inhaler 2 Puff(s) Inhalation every 6 hours PRN  benzocaine 15 mG/menthol 3.6 mG (Sugar-Free) Lozenge 1 Lozenge Oral every 4 hours PRN  dexAMETHasone  Injectable 6 milliGRAM(s) IV Push daily  enoxaparin Injectable 40 milliGRAM(s) SubCutaneous daily  glucagon  Injectable 1 milliGRAM(s) IntraMuscular once  guaiFENesin Oral Liquid (Sugar-Free) 200 milliGRAM(s) Oral every 6 hours PRN  influenza  Vaccine (HIGH DOSE) 0.7 milliLiter(s) IntraMuscular once  insulin glargine Injectable (LANTUS) 18 Unit(s) SubCutaneous at bedtime  insulin lispro (ADMELOG) corrective regimen sliding scale   SubCutaneous three times a day before meals  insulin lispro (ADMELOG) corrective regimen sliding scale   SubCutaneous at bedtime  insulin lispro Injectable (ADMELOG) 3 Unit(s) SubCutaneous three times a day before meals  levothyroxine 88 MICROGram(s) Oral daily  metoprolol succinate ER 25 milliGRAM(s) Oral daily  pantoprazole    Tablet 40 milliGRAM(s) Oral before breakfast  polyethylene glycol 3350 17 Gram(s) Oral daily  senna 2 Tablet(s) Oral at bedtime  sodium chloride 0.65% Nasal 1 Spray(s) Both Nostrils four times a day PRN                            12.9   8.72  )-----------( 354      ( 09 Dec 2021 07:54 )             39.0       Hemoglobin: 12.9 g/dL (12-09 @ 07:54)  Hemoglobin: 12.7 g/dL (12-08 @ 07:06)  Hemoglobin: 13.0 g/dL (12-07 @ 08:08)  Hemoglobin: 12.7 g/dL (12-06 @ 06:45)  Hemoglobin: 13.0 g/dL (12-05 @ 06:21)      12-09    133<L>  |  98  |  12  ----------------------------<  260<H>  3.8   |  31  |  0.67    Ca    9.6      09 Dec 2021 07:54  Phos  2.5     12-09  Mg     2.0     12-09    TPro  6.6  /  Alb  2.2<L>  /  TBili  0.4  /  DBili  0.1  /  AST  21  /  ALT  29  /  AlkPhos  66  12-09    Creatinine Trend: 0.67<--, 0.74<--, 0.78<--, 0.98<--, 0.93<--, 0.80<--    COAGS: PT/INR - ( 09 Dec 2021 07:54 )   PT: 15.3 sec;   INR: 1.30 ratio             CARDIAC MARKERS ( 07 Dec 2021 08:08 )  x     / x     / 27 U/L / x     / x      Per Chart      T(C): 36.7 (12-09-21 @ 10:27), Max: 37.1 (12-08-21 @ 23:54)  HR: 89 (12-09-21 @ 10:27) (67 - 89)  BP: 108/92 (12-09-21 @ 10:27) (108/92 - 147/83)  RR: 20 (12-09-21 @ 10:27) (18 - 20)  SpO2: 97% (12-09-21 @ 10:27) (84% - 98%)  Wt(kg): --    I&O's Summary    08 Dec 2021 07:01  -  09 Dec 2021 07:00  --------------------------------------------------------  IN: 720 mL / OUT: 0 mL / NET: 720 mL        HEENT:  (-)icterus (-)pallor  CV: N S1 S2 1/6 JUANCARLOS (+)2 Pulses B/l  Resp:  Clear to ausculatation B/L, normal effort  GI: (+) BS Soft, NT, ND  Lymph:  (-)Edema, (-)obvious lymphadenopathy  Skin: Warm to touch, Normal turgor  Psych: Appropriate mood and affect      TELEMETRY: 	  OFF        ASSESSMENT/PLAN: 	66y  Female past medical hx of hypertension, DM, hypothyroidism came to ED c/o shortness of breath for 2 days that worsened today associated to dry cough and fatigue found to be covid (+) and incidentally noted with enlarged PA on CTA concerning for elevated PA pressures.  (-) PE    - low BNP and no strain pattern on EKG is reassuring   - Outpt echo once acute covid infection resolves and off airborne  - Pulm F/u  - treatment of COVID per primary team  - Monitor O2 Sat with exertion    Russell Perez MD, FACC  BEEPER [phone]

## 2021-12-09 NOTE — DIETITIAN INITIAL EVALUATION ADULT. - PERTINENT MEDS FT
MEDICATIONS  (STANDING):  dexAMETHasone  Injectable 6 milliGRAM(s) IV Push daily  enoxaparin Injectable 40 milliGRAM(s) SubCutaneous daily  glucagon  Injectable 1 milliGRAM(s) IntraMuscular once  influenza  Vaccine (HIGH DOSE) 0.7 milliLiter(s) IntraMuscular once  insulin glargine Injectable (LANTUS) 18 Unit(s) SubCutaneous at bedtime  insulin lispro (ADMELOG) corrective regimen sliding scale   SubCutaneous three times a day before meals  insulin lispro (ADMELOG) corrective regimen sliding scale   SubCutaneous at bedtime  insulin lispro Injectable (ADMELOG) 3 Unit(s) SubCutaneous three times a day before meals  levothyroxine 88 MICROGram(s) Oral daily  metoprolol succinate ER 25 milliGRAM(s) Oral daily  pantoprazole    Tablet 40 milliGRAM(s) Oral before breakfast  polyethylene glycol 3350 17 Gram(s) Oral daily  senna 2 Tablet(s) Oral at bedtime

## 2021-12-10 LAB
ALBUMIN SERPL ELPH-MCNC: 2 G/DL — LOW (ref 3.5–5)
ALP SERPL-CCNC: 67 U/L — SIGNIFICANT CHANGE UP (ref 40–120)
ALT FLD-CCNC: 26 U/L DA — SIGNIFICANT CHANGE UP (ref 10–60)
ANION GAP SERPL CALC-SCNC: 3 MMOL/L — LOW (ref 5–17)
AST SERPL-CCNC: 15 U/L — SIGNIFICANT CHANGE UP (ref 10–40)
BILIRUB DIRECT SERPL-MCNC: 0.1 MG/DL — SIGNIFICANT CHANGE UP (ref 0–0.3)
BILIRUB INDIRECT FLD-MCNC: 0.2 MG/DL — SIGNIFICANT CHANGE UP (ref 0.2–1)
BILIRUB SERPL-MCNC: 0.3 MG/DL — SIGNIFICANT CHANGE UP (ref 0.2–1.2)
BUN SERPL-MCNC: 11 MG/DL — SIGNIFICANT CHANGE UP (ref 7–18)
CALCIUM SERPL-MCNC: 8.9 MG/DL — SIGNIFICANT CHANGE UP (ref 8.4–10.5)
CHLORIDE SERPL-SCNC: 99 MMOL/L — SIGNIFICANT CHANGE UP (ref 96–108)
CO2 SERPL-SCNC: 32 MMOL/L — HIGH (ref 22–31)
CREAT SERPL-MCNC: 0.61 MG/DL — SIGNIFICANT CHANGE UP (ref 0.5–1.3)
GLUCOSE BLDC GLUCOMTR-MCNC: 238 MG/DL — HIGH (ref 70–99)
GLUCOSE BLDC GLUCOMTR-MCNC: 262 MG/DL — HIGH (ref 70–99)
GLUCOSE BLDC GLUCOMTR-MCNC: 394 MG/DL — HIGH (ref 70–99)
GLUCOSE BLDC GLUCOMTR-MCNC: 401 MG/DL — HIGH (ref 70–99)
GLUCOSE BLDC GLUCOMTR-MCNC: 486 MG/DL — CRITICAL HIGH (ref 70–99)
GLUCOSE SERPL-MCNC: 251 MG/DL — HIGH (ref 70–99)
HCT VFR BLD CALC: 37 % — SIGNIFICANT CHANGE UP (ref 34.5–45)
HGB BLD-MCNC: 12.2 G/DL — SIGNIFICANT CHANGE UP (ref 11.5–15.5)
INR BLD: 1.19 RATIO — HIGH (ref 0.88–1.16)
MAGNESIUM SERPL-MCNC: 2.1 MG/DL — SIGNIFICANT CHANGE UP (ref 1.6–2.6)
MCHC RBC-ENTMCNC: 28.8 PG — SIGNIFICANT CHANGE UP (ref 27–34)
MCHC RBC-ENTMCNC: 33 GM/DL — SIGNIFICANT CHANGE UP (ref 32–36)
MCV RBC AUTO: 87.3 FL — SIGNIFICANT CHANGE UP (ref 80–100)
NRBC # BLD: 0 /100 WBCS — SIGNIFICANT CHANGE UP (ref 0–0)
PHOSPHATE SERPL-MCNC: 2.5 MG/DL — SIGNIFICANT CHANGE UP (ref 2.5–4.5)
PLATELET # BLD AUTO: 341 K/UL — SIGNIFICANT CHANGE UP (ref 150–400)
POTASSIUM SERPL-MCNC: 4 MMOL/L — SIGNIFICANT CHANGE UP (ref 3.5–5.3)
POTASSIUM SERPL-SCNC: 4 MMOL/L — SIGNIFICANT CHANGE UP (ref 3.5–5.3)
PROT SERPL-MCNC: 6.1 G/DL — SIGNIFICANT CHANGE UP (ref 6–8.3)
PROTHROM AB SERPL-ACNC: 14 SEC — HIGH (ref 10.6–13.6)
RBC # BLD: 4.24 M/UL — SIGNIFICANT CHANGE UP (ref 3.8–5.2)
RBC # FLD: 12.5 % — SIGNIFICANT CHANGE UP (ref 10.3–14.5)
SODIUM SERPL-SCNC: 134 MMOL/L — LOW (ref 135–145)
WBC # BLD: 7.69 K/UL — SIGNIFICANT CHANGE UP (ref 3.8–10.5)
WBC # FLD AUTO: 7.69 K/UL — SIGNIFICANT CHANGE UP (ref 3.8–10.5)

## 2021-12-10 RX ORDER — INSULIN LISPRO 100/ML
5 VIAL (ML) SUBCUTANEOUS
Refills: 0 | Status: DISCONTINUED | OUTPATIENT
Start: 2021-12-10 | End: 2021-12-12

## 2021-12-10 RX ORDER — DEXAMETHASONE 0.5 MG/5ML
1 ELIXIR ORAL
Qty: 20 | Refills: 0
Start: 2021-12-10 | End: 2021-12-21

## 2021-12-10 RX ORDER — RIVAROXABAN 15 MG-20MG
1 KIT ORAL
Qty: 30 | Refills: 0
Start: 2021-12-10 | End: 2022-01-08

## 2021-12-10 RX ADMIN — Medication 25 MILLIGRAM(S): at 05:36

## 2021-12-10 RX ADMIN — Medication 3: at 21:35

## 2021-12-10 RX ADMIN — Medication 5 UNIT(S): at 16:56

## 2021-12-10 RX ADMIN — Medication 3: at 08:26

## 2021-12-10 RX ADMIN — Medication 6: at 11:46

## 2021-12-10 RX ADMIN — Medication 3 UNIT(S): at 08:27

## 2021-12-10 RX ADMIN — Medication 6 MILLIGRAM(S): at 05:35

## 2021-12-10 RX ADMIN — INSULIN GLARGINE 18 UNIT(S): 100 INJECTION, SOLUTION SUBCUTANEOUS at 21:35

## 2021-12-10 RX ADMIN — Medication 6: at 16:56

## 2021-12-10 RX ADMIN — PANTOPRAZOLE SODIUM 40 MILLIGRAM(S): 20 TABLET, DELAYED RELEASE ORAL at 05:36

## 2021-12-10 RX ADMIN — ENOXAPARIN SODIUM 40 MILLIGRAM(S): 100 INJECTION SUBCUTANEOUS at 11:46

## 2021-12-10 RX ADMIN — Medication 88 MICROGRAM(S): at 05:36

## 2021-12-10 RX ADMIN — POLYETHYLENE GLYCOL 3350 17 GRAM(S): 17 POWDER, FOR SOLUTION ORAL at 11:47

## 2021-12-10 RX ADMIN — Medication 3 UNIT(S): at 11:46

## 2021-12-10 RX ADMIN — SENNA PLUS 2 TABLET(S): 8.6 TABLET ORAL at 21:36

## 2021-12-10 NOTE — PROGRESS NOTE ADULT - PROBLEM SELECTOR PLAN 1
CXR b/l multifocal pneumonia  CTA:  1.  No PE  2.  Lung findings typically seen in COVID 19 infection  3.  Enlarged pulmonary artery compared to adjacent ascending aorta suggestive of pulmonary hypertension. Correlation with echocardiogram is recommended.  Echo as outpatient   - started on Albuterol and ipratropium MDI   - continue to trend d-dimer, CRP, LDH, Troponin, Ferritin, CPK  - Tylenol PRN for fever  - Monitor for hemodynamic instability  - s/p Remdesivir (5/5 days)  - c/w isolation precautions.  - c/w decadron   - c/w O2 supplementation, titrate down as tolerated (on 2L 12/10)  88% on RA ambulation, qualifies for home O2   Cardio Ana consulted CXR b/l multifocal pneumonia  CTA:  1.  No PE  2.  Lung findings typically seen in COVID 19 infection  3.  Enlarged pulmonary artery compared to adjacent ascending aorta suggestive of pulmonary hypertension. Correlation with echocardiogram is recommended.  Echo as outpatient   - started on Albuterol and ipratropium MDI   - continue to trend d-dimer, CRP, LDH, Troponin, Ferritin, CPK  - Tylenol PRN for fever  - Monitor for hemodynamic instability  - s/p Remdesivir (5/5 days)  - c/w isolation precautions.  - c/w decadron   - c/w O2 supplementation, titrate down as tolerated (on 2L 12/10)  88% on RA ambulation, qualifies for home O2   DC on steroid taper  6mg x1, 4mg x2, 2mg x2,1mg x2 days    Cardio Ana consulted

## 2021-12-10 NOTE — PROGRESS NOTE ADULT - PROBLEM SELECTOR PLAN 3
on glipizide, pioglitazone, alogliptin metformin, dapagliflozin at home  Holding home po medications  - Started on HSS  - Fingerstick before meals and at bedtime  - DASH diet with consistent carb  - Target -180  - A1c 9.0  - target CBG < 180  c/w 18u lantus + 3u admelog tid   c/w ssi on glipizide, pioglitazone, alogliptin metformin, dapagliflozin at home  Holding home po medications  - Started on HSS  - Fingerstick before meals and at bedtime  - DASH diet with consistent carb  - Target -180  - A1c 9.0  - target CBG < 180  c/w 18u lantus + 5u admelog tid   c/w ssi

## 2021-12-10 NOTE — PROGRESS NOTE ADULT - SUBJECTIVE AND OBJECTIVE BOX
PGY-1 Progress Note discussed with attending    PAGER #: [[phone]] TILL 5:00 PM  PLEASE CONTACT ON CALL TEAM:  - On Call Team (Please refer to Edna) FROM 5:00 PM - 8:30PM  - Nightfloat Team FROM 8:30 -7:30 AM    CHIEF COMPLAINT & BRIEF HOSPITAL COURSE:  66 year old female from home, lives alone, has past medical hx of hypertension, DM, hypothyroidism came to ED with complains of shortness of breath for 2 days that worsened today associated with dry cough and fatigue. As per patient, generalized weakness started about 4 days ago associated to tiredness, poor appetite and low energy. Patient denies any fever, chills, sick contacts, nausea, vomiting, diarrhea, bleeding, chest pain or palpitations. Of note patient was vaccinated on April with [employer]  (03 Dec 2021 20:55)  Patient noted to saturating in 70s in ED on RA , was placed on NRB 15liters with improvement to 93%, Patient noted to desaturate  with minimal exertions to low 90s with wob , ICU consulted for close monitoring.  ICU Course: Patient was started on Decadron and Remdesivir. Her O2 saturations improved. she was transitioned to nasal cannula. Patient noted to be saturating above 90% transferred to medical floor for further management.     INTERVAL HPI/OVERNIGHT EVENTS:   Patient seen and examined at bedside. No overnight events. Patient reports general improvement. On 2L NC. Will DC for tomorrow with home O2     REVIEW OF SYSTEMS:  CONSTITUTIONAL: No fever, night sweats, weight loss, or fatigue  RESPIRATORY: No cough, wheezing, or hemoptysis; +shortness of breath on exertion  CARDIOVASCULAR: No chest pain, palpitations, dizziness, or leg swelling  GASTROINTESTINAL: No abdominal pain. No nausea, vomiting, or hematemesis; No diarrhea or constipation. No melena or hematochezia.  GENITOURINARY: No dysuria or hematuria, no urinary frequency  NEUROLOGICAL: No headaches, memory loss, loss of strength, numbness, or tremors  SKIN: No itching, burning, rashes, or lesions     Vital Signs Last 24 Hrs  T(C): 36.4 (10 Dec 2021 07:45), Max: 36.6 (09 Dec 2021 23:41)  T(F): 97.6 (10 Dec 2021 07:45), Max: 97.8 (09 Dec 2021 23:41)  HR: 83 (10 Dec 2021 07:45) (63 - 83)  BP: 130/76 (10 Dec 2021 07:45) (112/74 - 130/76)  BP(mean): --  RR: 18 (10 Dec 2021 08:34) (18 - 19)  SpO2: 98% (10 Dec 2021 08:34) (91% - 98%)    PHYSICAL EXAMINATION:  GENERAL: NAD, well built, on 2L NC   HEAD:  Atraumatic, Normocephalic  EYES:  conjunctiva and sclera clear  NECK: Supple, No JVD, thyroid not examined   CHEST/LUNG: Clear to auscultation. No wheezing, rales, rubs or rhonchi  HEART: Regular rate and rhythm; Clear S1 and S2, No murmurs, rubs, or gallops  ABDOMEN: Soft, Nontender, Nondistended; Bowel sounds present  NERVOUS SYSTEM:  Alert & Oriented X3, CNII-XII intact, no focal neurological deficits   EXTREMITIES:  2+ Peripheral Pulses, No clubbing, cyanosis, or edema  SKIN: warm dry, no lesions noted                         12.2   7.69  )-----------( 341      ( 10 Dec 2021 07:28 )             37.0     12-10    134<L>  |  99  |  11  ----------------------------<  251<H>  4.0   |  32<H>  |  0.61    Ca    8.9      10 Dec 2021 07:28  Phos  2.5     12-10  Mg     2.1     12-10    TPro  6.1  /  Alb  2.0<L>  /  TBili  0.3  /  DBili  0.1  /  AST  15  /  ALT  26  /  AlkPhos  67  12-10    LIVER FUNCTIONS - ( 10 Dec 2021 07:28 )  Alb: 2.0 g/dL / Pro: 6.1 g/dL / ALK PHOS: 67 U/L / ALT: 26 U/L DA / AST: 15 U/L / GGT: x             PT/INR - ( 10 Dec 2021 07:28 )   PT: 14.0 sec;   INR: 1.19 ratio          CAPILLARY BLOOD GLUCOSE      RADIOLOGY & ADDITIONAL TESTS:

## 2021-12-10 NOTE — PROGRESS NOTE ADULT - SUBJECTIVE AND OBJECTIVE BOX
C A R D I O L O G Y  **********************************     DATE OF SERVICE: 12-10-21    Patient denies chest pain or shortness of breath.   Review of symptoms otherwise negative.    acetaminophen     Tablet .. 650 milliGRAM(s) Oral every 6 hours PRN  ALBUTerol    90 MICROgram(s) HFA Inhaler 2 Puff(s) Inhalation every 6 hours PRN  benzocaine 15 mG/menthol 3.6 mG (Sugar-Free) Lozenge 1 Lozenge Oral every 4 hours PRN  dexAMETHasone  Injectable 6 milliGRAM(s) IV Push daily  enoxaparin Injectable 40 milliGRAM(s) SubCutaneous daily  glucagon  Injectable 1 milliGRAM(s) IntraMuscular once  guaiFENesin Oral Liquid (Sugar-Free) 200 milliGRAM(s) Oral every 6 hours PRN  insulin glargine Injectable (LANTUS) 18 Unit(s) SubCutaneous at bedtime  insulin lispro (ADMELOG) corrective regimen sliding scale   SubCutaneous three times a day before meals  insulin lispro (ADMELOG) corrective regimen sliding scale   SubCutaneous at bedtime  insulin lispro Injectable (ADMELOG) 3 Unit(s) SubCutaneous three times a day before meals  levothyroxine 88 MICROGram(s) Oral daily  metoprolol succinate ER 25 milliGRAM(s) Oral daily  pantoprazole    Tablet 40 milliGRAM(s) Oral before breakfast  polyethylene glycol 3350 17 Gram(s) Oral daily  senna 2 Tablet(s) Oral at bedtime  sodium chloride 0.65% Nasal 1 Spray(s) Both Nostrils four times a day PRN                            12.2   7.69  )-----------( 341      ( 10 Dec 2021 07:28 )             37.0       Hemoglobin: 12.2 g/dL (12-10 @ 07:28)  Hemoglobin: 12.9 g/dL (12-09 @ 07:54)  Hemoglobin: 12.7 g/dL (12-08 @ 07:06)  Hemoglobin: 13.0 g/dL (12-07 @ 08:08)  Hemoglobin: 12.7 g/dL (12-06 @ 06:45)      12-10    134<L>  |  99  |  11  ----------------------------<  251<H>  4.0   |  32<H>  |  0.61    Ca    8.9      10 Dec 2021 07:28  Phos  2.5     12-10  Mg     2.1     12-10    TPro  6.1  /  Alb  2.0<L>  /  TBili  0.3  /  DBili  0.1  /  AST  15  /  ALT  26  /  AlkPhos  67  12-10    Creatinine Trend: 0.61<--, 0.67<--, 0.74<--, 0.78<--, 0.98<--, 0.93<--    COAGS: PT/INR - ( 10 Dec 2021 07:28 )   PT: 14.0 sec;   INR: 1.19 ratio    per chart       T(C): 36.7 (12-10-21 @ 11:37), Max: 36.7 (12-10-21 @ 11:37)  HR: 95 (12-10-21 @ 11:37) (63 - 95)  BP: 106/62 (12-10-21 @ 11:37) (106/62 - 130/76)  RR: 18 (12-10-21 @ 11:37) (18 - 19)  SpO2: 97% (12-10-21 @ 11:37) (91% - 98%)  Wt(kg): --    I&O's Summary        HEENT:  (-)icterus (-)pallor  CV: N S1 S2 1/6 JUANCARLOS (+)2 Pulses B/l  Resp:  Clear to ausculatation B/L, normal effort  GI: (+) BS Soft, NT, ND  Lymph:  (-)Edema, (-)obvious lymphadenopathy  Skin: Warm to touch, Normal turgor  Psych: Appropriate mood and affect      TELEMETRY: 	  OFF        ASSESSMENT/PLAN: 	66y  Female past medical hx of hypertension, DM, hypothyroidism came to ED c/o shortness of breath for 2 days that worsened today associated to dry cough and fatigue found to be covid (+) and incidentally noted with enlarged PA on CTA concerning for elevated PA pressures.  (-) PE    - low BNP and no strain pattern on EKG is reassuring   - Outpt echo once acute covid infection resolves and off airborne  - Pulm F/u  - treatment of COVID per primary team  - Monitor O2 Sat with exertion      Russell Perez MD, [facility]  BEEPER [phone]

## 2021-12-10 NOTE — PROGRESS NOTE ADULT - ATTENDING COMMENTS
HPI:    66 year old female from home, lives alone, has past medical hx of hypertension, DM, hypothyroidism came to ED c/o shortness of breath for 2 days that worsened today associated to dry cough and fatigue. As per patient, generalized weakness started about 4 days ago associated to tiredness, poor appetite and low energy. Patient denies any fever, chills, sick contacts, nausea, vomiting, diarrhea, bleeding, chest pain or palpitations. Of note patient was vaccinated on April with [employer]  (03 Dec 2021 20:55)    - HYPOXIC RESPIRATORY FAILURE DUE TO COVID 19 PNEUMONIA - S/P IV REMDESEVIR AND ON DECADRON ( ON 12/03/21 ) & INJ. LOVENOX PROPHYLAXIS ,  O2 SUPPLEMENTS. PATIENT WAS MONITORED IN ICU . PATIENT IS SEEN IMPROVING CLINICALLY ON CURRENT MANAGEMENT   -  CTA CHEST NEGATIVE FOR PE, NOTED TO HAVE INFILTRATE AND CHANGES SUSPICIOUS FOR PULM HTN  - PULMONARY CONSULT AND CARDIOLOGY CONSULT   - RECOMMENDATION FOR OUTPATIENT ECHOCARDIOGRAM POST-COVID   - ENCOURAGED FOR PRONE-POSITIONING AS TOLERATED    - DOWN GRADE FROM ICU TEAM  TO FLOOR AFTER IMPROVING HYPOXIC RESPIRATORY FAILURE - TOLERATING WELL ON NASAL CANULA     # HYPERGLYCEMIA S/T STEROIDS, W/ UNDERLYING DM - TITRATING LANTUS, SSI + FS  - NOTED HBA1C IS 9    - ISOLATION DROPLET     - DM TYPE 2, HYPOTHYROIDISM, OBESITY   - GI AND DVT PROPHYLAXIS . HPI:      66 year old female from home, lives alone, has past medical hx of hypertension, DM, hypothyroidism came to ED c/o shortness of breath for 2 days that worsened today associated to dry cough and fatigue. As per patient, generalized weakness started about 4 days ago associated to tiredness, poor appetite and low energy. Patient denies any fever, chills, sick contacts, nausea, vomiting, diarrhea, bleeding, chest pain or palpitations. Of note patient was vaccinated on April with [employer].  (03 Dec 2021 20:55)    - HYPOXIC RESPIRATORY FAILURE DUE TO COVID 19 PNEUMONIA - S/P IV REMDESEVIR AND ON DECADRON ( ON 12/03/21 ) & INJ. LOVENOX PROPHYLAXIS ,  O2 SUPPLEMENTS. PATIENT WAS MONITORED IN ICU . PATIENT IS SEEN IMPROVING CLINICALLY ON CURRENT MANAGEMENT   -  CTA CHEST NEGATIVE FOR PE, NOTED TO HAVE INFILTRATE AND CHANGES SUSPICIOUS FOR PULM HTN  - PULMONARY CONSULT AND CARDIOLOGY CONSULT   - RECOMMENDATION FOR OUTPATIENT ECHOCARDIOGRAM POST-COVID   - ENCOURAGED FOR PRONE-POSITIONING AS TOLERATED       - DOWN GRADE FROM ICU TEAM  TO FLOOR AFTER IMPROVING HYPOXIC RESPIRATORY FAILURE - TOLERATING WELL ON NASAL CANULA     # HYPERGLYCEMIA S/T STEROIDS, W/ UNDERLYING DM - TITRATING LANTUS, SSI + FS  - NOTED HBA1C IS 9      - ISOLATION DROPLET     - DM TYPE 2, HYPOTHYROIDISM, OBESITY   - GI AND DVT PROPHYLAXIS . HPI:      66 year old female from home, lives alone, has past medical hx of hypertension, DM, hypothyroidism came to ED c/o shortness of breath for 2 days that worsened today associated to dry cough and fatigue. As per patient, generalized weakness started about 4 days ago associated to tiredness, poor appetite and low energy. Patient denies any fever, chills, sick contacts, nausea, vomiting, diarrhea, bleeding, chest pain or palpitations. Of note patient was vaccinated on April with [employer]  (03 Dec 2021 20:55)    - CASE D/W PATIENT AT LENGTH - INCLUDING PLAN FOR D/C ON A/C FOR 30 DAYS [PER [facility] PROTOCOL], DECADRON, SUPPLEMENTAL OXYGEN AND INSULIN [DIABETIC EDUCATION]. CASE MANAGEMENT TEAM COORDINATING FOR OXYGEN DELIVERY AND USE/EDUCATION AND HOME-CARE ARRANGEMENTS. 24 HOUR D/C NOTICE PROVIDED TO PATIENT. DISCUSSED W/ PATIENT REGARDING UPDATING FAMILY/FRIEND OF HER CHOICE FOR SUPPORT -- PATIENT DEFERRED AT THIS TIME.     - HYPOXIC RESPIRATORY FAILURE DUE TO COVID 19 PNEUMONIA - S/P IV REMDESEVIR AND ON DECADRON ( ON 12/03/21 ) & INJ. LOVENOX PROPHYLAXIS ,  O2 SUPPLEMENTS. PATIENT WAS MONITORED IN ICU . PATIENT IS SEEN IMPROVING CLINICALLY ON CURRENT MANAGEMENT   -  CTA CHEST NEGATIVE FOR PE, NOTED TO HAVE INFILTRATE AND CHANGES SUSPICIOUS FOR PULM HTN  - PULMONARY CONSULT AND CARDIOLOGY CONSULT   - RECOMMENDATION FOR OUTPATIENT ECHOCARDIOGRAM POST-COVID   - ENCOURAGED FOR PRONE-POSITIONING AS TOLERATED       - DOWN GRADE FROM ICU TEAM  TO FLOOR AFTER IMPROVING HYPOXIC RESPIRATORY FAILURE - TOLERATING WELL ON NASAL CANULA     # HYPERGLYCEMIA S/T STEROIDS, W/ UNDERLYING DM - TITRATING INSULIN, LANTUS, TID INSULIN, SSI + FS  - NOTED HBA1C IS 9      - ISOLATION DROPLET     - DM TYPE 2, HYPOTHYROIDISM, OBESITY   - GI AND DVT PROPHYLAXIS .

## 2021-12-11 LAB
GLUCOSE BLDC GLUCOMTR-MCNC: 224 MG/DL — HIGH (ref 70–99)
GLUCOSE BLDC GLUCOMTR-MCNC: 314 MG/DL — HIGH (ref 70–99)
GLUCOSE BLDC GLUCOMTR-MCNC: 316 MG/DL — HIGH (ref 70–99)
GLUCOSE BLDC GLUCOMTR-MCNC: 322 MG/DL — HIGH (ref 70–99)

## 2021-12-11 RX ORDER — DEXAMETHASONE 0.5 MG/5ML
1 ELIXIR ORAL
Qty: 15 | Refills: 0
Start: 2021-12-11 | End: 2021-12-22

## 2021-12-11 RX ORDER — ENOXAPARIN SODIUM 100 MG/ML
18 INJECTION SUBCUTANEOUS
Qty: 3 | Refills: 0
Start: 2021-12-11 | End: 2022-01-09

## 2021-12-11 RX ORDER — ISOPROPYL ALCOHOL, BENZOCAINE .7; .06 ML/ML; ML/ML
1 SWAB TOPICAL
Qty: 100 | Refills: 1
Start: 2021-12-11 | End: 2022-01-29

## 2021-12-11 RX ADMIN — Medication 5 UNIT(S): at 17:01

## 2021-12-11 RX ADMIN — Medication 25 MILLIGRAM(S): at 05:47

## 2021-12-11 RX ADMIN — Medication 4: at 17:01

## 2021-12-11 RX ADMIN — Medication 2: at 08:31

## 2021-12-11 RX ADMIN — POLYETHYLENE GLYCOL 3350 17 GRAM(S): 17 POWDER, FOR SOLUTION ORAL at 12:37

## 2021-12-11 RX ADMIN — ENOXAPARIN SODIUM 40 MILLIGRAM(S): 100 INJECTION SUBCUTANEOUS at 12:37

## 2021-12-11 RX ADMIN — SENNA PLUS 2 TABLET(S): 8.6 TABLET ORAL at 21:47

## 2021-12-11 RX ADMIN — Medication 6 MILLIGRAM(S): at 05:47

## 2021-12-11 RX ADMIN — Medication 5 UNIT(S): at 12:35

## 2021-12-11 RX ADMIN — Medication 4: at 12:24

## 2021-12-11 RX ADMIN — PANTOPRAZOLE SODIUM 40 MILLIGRAM(S): 20 TABLET, DELAYED RELEASE ORAL at 06:13

## 2021-12-11 RX ADMIN — Medication 2: at 21:46

## 2021-12-11 RX ADMIN — Medication 88 MICROGRAM(S): at 05:46

## 2021-12-11 RX ADMIN — Medication 5 UNIT(S): at 08:32

## 2021-12-11 RX ADMIN — INSULIN GLARGINE 18 UNIT(S): 100 INJECTION, SOLUTION SUBCUTANEOUS at 21:47

## 2021-12-11 NOTE — PROGRESS NOTE ADULT - ATTENDING COMMENTS
HPI:      66 year old female from home, lives alone, has past medical hx of hypertension, DM, hypothyroidism came to ED c/o shortness of breath for 2 days that worsened today associated to dry cough and fatigue. As per patient, generalized weakness started about 4 days ago associated to tiredness, poor appetite and low energy. Patient denies any fever, chills, sick contacts, nausea, vomiting, diarrhea, bleeding, chest pain or palpitations. Of note patient was vaccinated on April with [employer]  (03 Dec 2021 20:55)    - CASE D/W PATIENT AT LENGTH - INCLUDING PLAN FOR D/C ON A/C FOR 30 DAYS [PER [facility] PROTOCOL], DECADRON, SUPPLEMENTAL OXYGEN AND INSULIN [DIABETIC EDUCATION]. CASE MANAGEMENT TEAM COORDINATING FOR OXYGEN DELIVERY AND USE/EDUCATION AND HOME-CARE ARRANGEMENTS. 24 HOUR D/C NOTICE PROVIDED TO PATIENT. DISCUSSED W/ PATIENT REGARDING UPDATING FAMILY/FRIEND OF HER CHOICE FOR SUPPORT -- PATIENT DEFERRED AT THIS TIME.     - HYPOXIC RESPIRATORY FAILURE DUE TO COVID 19 PNEUMONIA - S/P IV REMDESEVIR AND ON DECADRON ( ON 12/03/21 ) & INJ. LOVENOX PROPHYLAXIS ,  O2 SUPPLEMENTS. PATIENT WAS MONITORED IN ICU . PATIENT IS SEEN IMPROVING CLINICALLY ON CURRENT MANAGEMENT   -  CTA CHEST NEGATIVE FOR PE, NOTED TO HAVE INFILTRATE AND CHANGES SUSPICIOUS FOR PULM HTN  - PULMONARY CONSULT AND CARDIOLOGY CONSULT   - RECOMMENDATION FOR OUTPATIENT ECHOCARDIOGRAM POST-COVID   - ENCOURAGED FOR PRONE-POSITIONING AS TOLERATED       - DOWN GRADE FROM ICU TEAM  TO FLOOR AFTER IMPROVING HYPOXIC RESPIRATORY FAILURE - TOLERATING WELL ON NASAL CANULA     # HYPERGLYCEMIA S/T STEROIDS, W/ UNDERLYING DM - TITRATING INSULIN, LANTUS, TID INSULIN, SSI + FS  - NOTED HBA1C IS 9      - ISOLATION DROPLET     - DM TYPE 2, HYPOTHYROIDISM, OBESITY   - GI AND DVT PROPHYLAXIS . HPI:      66 year old female from home, lives alone, has past medical hx of hypertension, DM, hypothyroidism came to ED c/o shortness of breath for 2 days that worsened today associated to dry cough and fatigue. As per patient, generalized weakness started about 4 days ago associated to tiredness, poor appetite and low energy. Patient denies any fever, chills, sick contacts, nausea, vomiting, diarrhea, bleeding, chest pain or palpitations. Of note patient was vaccinated on April with [employer]  (03 Dec 2021 20:55)    - CASE D/W PATIENT AT LENGTH - INCLUDING PLAN FOR D/C ON A/C FOR 30 DAYS [PER [facility] PROTOCOL], DECADRON, SUPPLEMENTAL OXYGEN AND INSULIN [DIABETIC EDUCATION]. CASE MANAGEMENT TEAM COORDINATING FOR OXYGEN DELIVERY AND USE/EDUCATION AND HOME-CARE ARRANGEMENTS. 24 HOUR D/C NOTICE PROVIDED TO PATIENT. DISCUSSED W/ PATIENT REGARDING UPDATING FAMILY/FRIEND OF HER CHOICE FOR SUPPORT -- PATIENT DEFERRED AT THIS TIME. PATIENT SPENT TIME WITH NURSING TEAM TO LEARN DIABETIC EDUCATION, SELF-CHECK FOR FINGERSTICK BLOOD GLUCOSE AND INSULIN ADMINISTRATION. SHE EXPRESSED COMFORT WITH PLAN FOR D/C.     - HYPOXIC RESPIRATORY FAILURE DUE TO COVID19 PNEUMONIA - S/P IV REMDESEVIR AND ON DECADRON ( ON 12/03/21 ) & INJ. LOVENOX PROPHYLAXIS ,  O2 SUPPLEMENTS. PATIENT WAS MONITORED IN ICU . PATIENT IS SEEN IMPROVING CLINICALLY ON CURRENT MANAGEMENT   -  CTA CHEST NEGATIVE FOR PE, NOTED TO HAVE INFILTRATE AND CHANGES SUSPICIOUS FOR PULM HTN  - PULMONARY CONSULT AND CARDIOLOGY CONSULT   - RECOMMENDATION FOR OUTPATIENT ECHOCARDIOGRAM POST-COVID   - ENCOURAGED FOR PRONE-POSITIONING AS TOLERATED       - DOWN GRADE FROM ICU TEAM  TO FLOOR AFTER IMPROVING HYPOXIC RESPIRATORY FAILURE - TOLERATING WELL ON NASAL CANULA     # HYPERGLYCEMIA S/T STEROIDS, W/ UNDERLYING DM - TITRATING INSULIN, LANTUS, TID INSULIN, SSI + FS  - NOTED HBA1C IS 9      - ISOLATION DROPLET     - DM TYPE 2, HYPOTHYROIDISM, OBESITY   - GI AND DVT PROPHYLAXIS .

## 2021-12-11 NOTE — PROGRESS NOTE ADULT - SUBJECTIVE AND OBJECTIVE BOX
C A R D I O L O G Y  **********************************    DATE OF SERVICE: 12-11-21    Patient denies chest pain breathing comfortable at rest    Review of symptoms otherwise negative.    acetaminophen     Tablet .. 650 milliGRAM(s) Oral every 6 hours PRN  ALBUTerol    90 MICROgram(s) HFA Inhaler 2 Puff(s) Inhalation every 6 hours PRN  benzocaine 15 mG/menthol 3.6 mG (Sugar-Free) Lozenge 1 Lozenge Oral every 4 hours PRN  enoxaparin Injectable 40 milliGRAM(s) SubCutaneous daily  glucagon  Injectable 1 milliGRAM(s) IntraMuscular once  guaiFENesin Oral Liquid (Sugar-Free) 200 milliGRAM(s) Oral every 6 hours PRN  insulin glargine Injectable (LANTUS) 18 Unit(s) SubCutaneous at bedtime  insulin lispro (ADMELOG) corrective regimen sliding scale   SubCutaneous three times a day before meals  insulin lispro (ADMELOG) corrective regimen sliding scale   SubCutaneous at bedtime  insulin lispro Injectable (ADMELOG) 5 Unit(s) SubCutaneous three times a day before meals  levothyroxine 88 MICROGram(s) Oral daily  metoprolol succinate ER 25 milliGRAM(s) Oral daily  pantoprazole    Tablet 40 milliGRAM(s) Oral before breakfast  polyethylene glycol 3350 17 Gram(s) Oral daily  senna 2 Tablet(s) Oral at bedtime  sodium chloride 0.65% Nasal 1 Spray(s) Both Nostrils four times a day PRN                            12.2   7.69  )-----------( 341      ( 10 Dec 2021 07:28 )             37.0       Hemoglobin: 12.2 g/dL (12-10 @ 07:28)  Hemoglobin: 12.9 g/dL (12-09 @ 07:54)  Hemoglobin: 12.7 g/dL (12-08 @ 07:06)  Hemoglobin: 13.0 g/dL (12-07 @ 08:08)      12-10    134<L>  |  99  |  11  ----------------------------<  251<H>  4.0   |  32<H>  |  0.61    Ca    8.9      10 Dec 2021 07:28  Phos  2.5     12-10  Mg     2.1     12-10    TPro  6.1  /  Alb  2.0<L>  /  TBili  0.3  /  DBili  0.1  /  AST  15  /  ALT  26  /  AlkPhos  67  12-10    Creatinine Trend: 0.61<--, 0.67<--, 0.74<--, 0.78<--, 0.98<--, 0.93<--    COAGS:     per chart        T(C): 36.8 (12-11-21 @ 16:32), Max: 36.8 (12-11-21 @ 04:55)  HR: 79 (12-11-21 @ 16:32) (69 - 88)  BP: 112/64 (12-11-21 @ 16:32) (112/64 - 141/68)  RR: 18 (12-11-21 @ 16:32) (18 - 20)  SpO2: 98% (12-11-21 @ 16:32) (93% - 98%)  Wt(kg): --    I&O's Summary    HEENT:  (-)icterus (-)pallor  CV: N S1 S2 1/6 JUANCARLOS (+)2 Pulses B/l  Resp:  Clear to ausculatation B/L, normal effort  GI: (+) BS Soft, NT, ND  Lymph:  (-)Edema, (-)obvious lymphadenopathy  Skin: Warm to touch, Normal turgor  Psych: Appropriate mood and affect      TELEMETRY: 	  OFF        ASSESSMENT/PLAN: 	66y  Female past medical hx of hypertension, DM, hypothyroidism came to ED c/o shortness of breath for 2 days that worsened today associated to dry cough and fatigue found to be covid (+) and incidentally noted with enlarged PA on CTA concerning for elevated PA pressures.  (-) PE    - low BNP and no strain pattern on EKG is reassuring   - Outpt echo once acute covid infection resolves and off airborne  - treatment of COVID per primary team  - Decreased O2 sat with ambulation qualifying for home O2  - outpt [facility] f/u [phone]      Russell Perez MD, [facility]  BEEPER [phone]

## 2021-12-11 NOTE — PROGRESS NOTE ADULT - PROBLEM SELECTOR PLAN 3
on glipizide, pioglitazone, alogliptin metformin, dapagliflozin at home  Holding home po medications  - Started on HSS  - Fingerstick before meals and at bedtime  - DASH diet with consistent carb  - Target -180  - A1c 9.0  - target CBG < 180  c/w 18u lantus + 5u admelog tid   c/w ssi  Victorinao Lancaster consulted

## 2021-12-11 NOTE — PROGRESS NOTE ADULT - PROBLEM SELECTOR PLAN 5
Lovenox for DVT ppx  Protonix for GI ppx

## 2021-12-11 NOTE — PROGRESS NOTE ADULT - PROBLEM SELECTOR PLAN 1
CXR b/l multifocal pneumonia  CTA:  1.  No PE  2.  Lung findings typically seen in COVID 19 infection  3.  Enlarged pulmonary artery compared to adjacent ascending aorta suggestive of pulmonary hypertension. Correlation with echocardiogram is recommended.  Echo as outpatient   - started on Albuterol and ipratropium MDI   - continue to trend d-dimer, CRP, LDH, Troponin, Ferritin, CPK  - Tylenol PRN for fever  - Monitor for hemodynamic instability  - s/p Remdesivir (5/5 days)  - c/w isolation precautions.  - c/w decadron   - c/w O2 supplementation, titrate down as tolerated (on 2L 12/10)  88% on RA ambulation, qualifies for home O2   DC on steroid taper  6mg x1, 4mg x2, 2mg x2,1mg x2 days    Cardio Ana consulted

## 2021-12-11 NOTE — PROGRESS NOTE ADULT - PROBLEM SELECTOR PLAN 2
restarted home metoprolol

## 2021-12-11 NOTE — PROGRESS NOTE ADULT - SUBJECTIVE AND OBJECTIVE BOX
PGY-1 Progress Note discussed with attending    PAGER #: [[phone]] TILL 5:00 PM  PLEASE CONTACT ON CALL TEAM:  - On Call Team (Please refer to Edna) FROM 5:00 PM - 8:30PM  - Nightfloat Team FROM 8:30 -7:30 AM    CHIEF COMPLAINT & BRIEF HOSPITAL COURSE:  66 year old female from home, lives alone, has past medical hx of hypertension, DM, hypothyroidism came to ED with complains of shortness of breath for 2 days that worsened today associated with dry cough and fatigue. As per patient, generalized weakness started about 4 days ago associated to tiredness, poor appetite and low energy. Patient denies any fever, chills, sick contacts, nausea, vomiting, diarrhea, bleeding, chest pain or palpitations. Of note patient was vaccinated on April with [employer]  (03 Dec 2021 20:55)  Patient noted to saturating in 70s in ED on RA , was placed on NRB 15liters with improvement to 93%, Patient noted to desaturate  with minimal exertions to low 90s with wob , ICU consulted for close monitoring.  ICU Course: Patient was started on Decadron and Remdesivir. Her O2 saturations improved. she was transitioned to nasal cannula. Patient noted to be saturating above 90% transferred to medical floor for further management.     INTERVAL HPI/OVERNIGHT EVENTS:   Patient seen and examined at bedside. No overnight events. Patient reports general improvement. On 2L NC. Pt refuses to leave today as she is anxious about injecting herself with insulin. Pt already on multiple PO DM regimens with poor glycemic control and needs insulin for DC. Education on injection provided by RN and Victoriano Lancaster consulted.      REVIEW OF SYSTEMS:  CONSTITUTIONAL: No fever, night sweats, weight loss, or fatigue  RESPIRATORY: No cough, wheezing, or hemoptysis; No shortness of breath  CARDIOVASCULAR: No chest pain, palpitations, dizziness, or leg swelling  GASTROINTESTINAL: No abdominal pain. No nausea, vomiting, or hematemesis; No diarrhea or constipation. No melena or hematochezia.  GENITOURINARY: No dysuria or hematuria, no urinary frequency  NEUROLOGICAL: No headaches, memory loss, loss of strength, numbness, or tremors  SKIN: No itching, burning, rashes, or lesions     Vital Signs Last 24 Hrs  T(C): 36.6 (11 Dec 2021 07:00), Max: 36.8 (11 Dec 2021 04:55)  T(F): 97.8 (11 Dec 2021 07:00), Max: 98.2 (11 Dec 2021 04:55)  HR: 72 (11 Dec 2021 07:00) (69 - 95)  BP: 141/68 (11 Dec 2021 07:00) (106/62 - 141/68)  BP(mean): --  RR: 18 (11 Dec 2021 07:00) (18 - 20)  SpO2: 93% (11 Dec 2021 07:00) (93% - 97%)    PHYSICAL EXAMINATION:  GENERAL: NAD, well built  HEAD:  Atraumatic, Normocephalic  EYES:  conjunctiva and sclera clear  NECK: Supple, No JVD, thyroid not examined   CHEST/LUNG: Clear to auscultation. No wheezing, rales, rubs or rhonchi  HEART: Regular rate and rhythm; Clear S1 and S2, No murmurs, rubs, or gallops  ABDOMEN: Soft, Nontender, Nondistended; Bowel sounds present  NERVOUS SYSTEM:  Alert & Oriented X3, CNII-XII intact, no focal neurological deficits   EXTREMITIES:  2+ Peripheral Pulses, No clubbing, cyanosis, or edema  SKIN: warm dry, no lesions noted                           12.2   7.69  )-----------( 341      ( 10 Dec 2021 07:28 )             37.0     12-10    134<L>  |  99  |  11  ----------------------------<  251<H>  4.0   |  32<H>  |  0.61    Ca    8.9      10 Dec 2021 07:28  Phos  2.5     12-10  Mg     2.1     12-10    TPro  6.1  /  Alb  2.0<L>  /  TBili  0.3  /  DBili  0.1  /  AST  15  /  ALT  26  /  AlkPhos  67  12-10    LIVER FUNCTIONS - ( 10 Dec 2021 07:28 )  Alb: 2.0 g/dL / Pro: 6.1 g/dL / ALK PHOS: 67 U/L / ALT: 26 U/L DA / AST: 15 U/L / GGT: x               PT/INR - ( 10 Dec 2021 07:28 )   PT: 14.0 sec;   INR: 1.19 ratio        CAPILLARY BLOOD GLUCOSE      RADIOLOGY & ADDITIONAL TESTS:

## 2021-12-12 ENCOUNTER — TRANSCRIPTION ENCOUNTER (OUTPATIENT)
Age: 66
End: 2021-12-12

## 2021-12-12 VITALS
RESPIRATION RATE: 18 BRPM | DIASTOLIC BLOOD PRESSURE: 70 MMHG | SYSTOLIC BLOOD PRESSURE: 113 MMHG | TEMPERATURE: 98 F | HEART RATE: 70 BPM | OXYGEN SATURATION: 98 %

## 2021-12-12 LAB
GLUCOSE BLDC GLUCOMTR-MCNC: 234 MG/DL — HIGH (ref 70–99)
GLUCOSE BLDC GLUCOMTR-MCNC: 279 MG/DL — HIGH (ref 70–99)

## 2021-12-12 PROCEDURE — 82962 GLUCOSE BLOOD TEST: CPT

## 2021-12-12 PROCEDURE — 82550 ASSAY OF CK (CPK): CPT

## 2021-12-12 PROCEDURE — 84484 ASSAY OF TROPONIN QUANT: CPT

## 2021-12-12 PROCEDURE — 87040 BLOOD CULTURE FOR BACTERIA: CPT

## 2021-12-12 PROCEDURE — 71275 CT ANGIOGRAPHY CHEST: CPT

## 2021-12-12 PROCEDURE — 85652 RBC SED RATE AUTOMATED: CPT

## 2021-12-12 PROCEDURE — 84443 ASSAY THYROID STIM HORMONE: CPT

## 2021-12-12 PROCEDURE — 80053 COMPREHEN METABOLIC PANEL: CPT

## 2021-12-12 PROCEDURE — 86140 C-REACTIVE PROTEIN: CPT

## 2021-12-12 PROCEDURE — 80076 HEPATIC FUNCTION PANEL: CPT

## 2021-12-12 PROCEDURE — 82565 ASSAY OF CREATININE: CPT

## 2021-12-12 PROCEDURE — 80048 BASIC METABOLIC PNL TOTAL CA: CPT

## 2021-12-12 PROCEDURE — 93005 ELECTROCARDIOGRAM TRACING: CPT

## 2021-12-12 PROCEDURE — 84100 ASSAY OF PHOSPHORUS: CPT

## 2021-12-12 PROCEDURE — 84145 PROCALCITONIN (PCT): CPT

## 2021-12-12 PROCEDURE — 83615 LACTATE (LD) (LDH) ENZYME: CPT

## 2021-12-12 PROCEDURE — 87635 SARS-COV-2 COVID-19 AMP PRB: CPT

## 2021-12-12 PROCEDURE — 99285 EMERGENCY DEPT VISIT HI MDM: CPT

## 2021-12-12 PROCEDURE — 85025 COMPLETE CBC W/AUTO DIFF WBC: CPT

## 2021-12-12 PROCEDURE — 85610 PROTHROMBIN TIME: CPT

## 2021-12-12 PROCEDURE — 85730 THROMBOPLASTIN TIME PARTIAL: CPT

## 2021-12-12 PROCEDURE — 86803 HEPATITIS C AB TEST: CPT

## 2021-12-12 PROCEDURE — 82728 ASSAY OF FERRITIN: CPT

## 2021-12-12 PROCEDURE — 85379 FIBRIN DEGRADATION QUANT: CPT

## 2021-12-12 PROCEDURE — 83036 HEMOGLOBIN GLYCOSYLATED A1C: CPT

## 2021-12-12 PROCEDURE — 71046 X-RAY EXAM CHEST 2 VIEWS: CPT

## 2021-12-12 PROCEDURE — 83735 ASSAY OF MAGNESIUM: CPT

## 2021-12-12 PROCEDURE — 36415 COLL VENOUS BLD VENIPUNCTURE: CPT

## 2021-12-12 PROCEDURE — 94640 AIRWAY INHALATION TREATMENT: CPT

## 2021-12-12 PROCEDURE — 83605 ASSAY OF LACTIC ACID: CPT

## 2021-12-12 PROCEDURE — 87086 URINE CULTURE/COLONY COUNT: CPT

## 2021-12-12 PROCEDURE — 81001 URINALYSIS AUTO W/SCOPE: CPT

## 2021-12-12 PROCEDURE — 85027 COMPLETE CBC AUTOMATED: CPT

## 2021-12-12 PROCEDURE — 83880 ASSAY OF NATRIURETIC PEPTIDE: CPT

## 2021-12-12 RX ORDER — DAPAGLIFLOZIN 10 MG/1
1 TABLET, FILM COATED ORAL
Qty: 0 | Refills: 0 | DISCHARGE

## 2021-12-12 RX ORDER — PIOGLITAZONE HYDROCHLORIDE 15 MG/1
1 TABLET ORAL
Qty: 0 | Refills: 0 | DISCHARGE

## 2021-12-12 RX ORDER — INSULIN GLARGINE 100 [IU]/ML
18 INJECTION, SOLUTION SUBCUTANEOUS
Qty: 2 | Refills: 0
Start: 2021-12-12 | End: 2021-12-17

## 2021-12-12 RX ORDER — ALOGLIPTIN AND METFORMIN HYDROCHLORIDE 12.5; 5 MG/1; MG/1
1 TABLET, FILM COATED ORAL
Qty: 0 | Refills: 0 | DISCHARGE

## 2021-12-12 RX ORDER — ENOXAPARIN SODIUM 100 MG/ML
18 INJECTION SUBCUTANEOUS
Qty: 3 | Refills: 0
Start: 2021-12-12 | End: 2022-01-10

## 2021-12-12 RX ADMIN — POLYETHYLENE GLYCOL 3350 17 GRAM(S): 17 POWDER, FOR SOLUTION ORAL at 11:48

## 2021-12-12 RX ADMIN — Medication 2: at 08:27

## 2021-12-12 RX ADMIN — Medication 25 MILLIGRAM(S): at 06:20

## 2021-12-12 RX ADMIN — Medication 3: at 11:47

## 2021-12-12 RX ADMIN — Medication 88 MICROGRAM(S): at 06:20

## 2021-12-12 RX ADMIN — Medication 5 UNIT(S): at 08:27

## 2021-12-12 RX ADMIN — ENOXAPARIN SODIUM 40 MILLIGRAM(S): 100 INJECTION SUBCUTANEOUS at 11:48

## 2021-12-12 RX ADMIN — PANTOPRAZOLE SODIUM 40 MILLIGRAM(S): 20 TABLET, DELAYED RELEASE ORAL at 06:20

## 2021-12-12 RX ADMIN — Medication 5 UNIT(S): at 11:48

## 2021-12-12 NOTE — PROGRESS NOTE ADULT - REASON FOR ADMISSION
AHRF 2/2 COVID19 PNA

## 2021-12-12 NOTE — PROGRESS NOTE ADULT - SUBJECTIVE AND OBJECTIVE BOX
C A R D I O L O G Y  **********************************    DATE OF SERVICE: 12-12-21    Patient denies chest pain or shortness of breath.   Review of symptoms otherwise negative.    acetaminophen     Tablet .. 650 milliGRAM(s) Oral every 6 hours PRN  ALBUTerol    90 MICROgram(s) HFA Inhaler 2 Puff(s) Inhalation every 6 hours PRN  benzocaine 15 mG/menthol 3.6 mG (Sugar-Free) Lozenge 1 Lozenge Oral every 4 hours PRN  enoxaparin Injectable 40 milliGRAM(s) SubCutaneous daily  glucagon  Injectable 1 milliGRAM(s) IntraMuscular once  guaiFENesin Oral Liquid (Sugar-Free) 200 milliGRAM(s) Oral every 6 hours PRN  insulin glargine Injectable (LANTUS) 18 Unit(s) SubCutaneous at bedtime  insulin lispro (ADMELOG) corrective regimen sliding scale   SubCutaneous at bedtime  insulin lispro (ADMELOG) corrective regimen sliding scale   SubCutaneous three times a day before meals  insulin lispro Injectable (ADMELOG) 5 Unit(s) SubCutaneous three times a day before meals  levothyroxine 88 MICROGram(s) Oral daily  metoprolol succinate ER 25 milliGRAM(s) Oral daily  pantoprazole    Tablet 40 milliGRAM(s) Oral before breakfast  polyethylene glycol 3350 17 Gram(s) Oral daily  senna 2 Tablet(s) Oral at bedtime  sodium chloride 0.65% Nasal 1 Spray(s) Both Nostrils four times a day PRN          Hemoglobin: 12.2 g/dL (12-10 @ 07:28)  Hemoglobin: 12.9 g/dL (12-09 @ 07:54)  Hemoglobin: 12.7 g/dL (12-08 @ 07:06)            Creatinine Trend: 0.61<--, 0.67<--, 0.74<--, 0.78<--, 0.98<--, 0.93<--    COAGS:     per chart      T(C): 36.6 (12-12-21 @ 11:17), Max: 36.9 (12-12-21 @ 07:47)  HR: 70 (12-12-21 @ 11:17) (64 - 79)  BP: 113/70 (12-12-21 @ 11:17) (112/64 - 199/62)  RR: 18 (12-12-21 @ 11:17) (18 - 18)  SpO2: 98% (12-12-21 @ 11:17) (93% - 98%)  Wt(kg): --    I&O's Summary    12 Dec 2021 07:01  -  12 Dec 2021 16:27  --------------------------------------------------------  IN: 434 mL / OUT: 0 mL / NET: 434 mL      HEENT:  (-)icterus (-)pallor  CV: N S1 S2 1/6 JUANCARLOS (+)2 Pulses B/l  Resp:  Clear to ausculatation B/L, normal effort  GI: (+) BS Soft, NT, ND  Lymph:  (-)Edema, (-)obvious lymphadenopathy  Skin: Warm to touch, Normal turgor  Psych: Appropriate mood and affect      TELEMETRY: 	  OFF        ASSESSMENT/PLAN: 	66y  Female past medical hx of hypertension, DM, hypothyroidism came to ED c/o shortness of breath for 2 days that worsened today associated to dry cough and fatigue found to be covid (+) and incidentally noted with enlarged PA on CTA concerning for elevated PA pressures.  (-) PE    - low BNP and no strain pattern on EKG is reassuring   - Outpt echo once acute covid infection resolves and off airborne  - treatment of COVID per primary team  - Decreased O2 sat with ambulation qualifying for home O2  - outpt [facility] f/u [phone]  - D/C planning in progress      Russell Perez MD, [facility]  BEEPER [phone]

## 2021-12-12 NOTE — DISCHARGE NOTE NURSING/CASE MANAGEMENT/SOCIAL WORK - PATIENT PORTAL LINK FT
You can access the FollowMyHealth Patient Portal offered by [facility] by registering at the following website: [email or web address]. By joining [facility]’s FollowMyHealth portal, you will also be able to view your health information using other applications (apps) compatible with our system.

## 2021-12-12 NOTE — DISCHARGE NOTE NURSING/CASE MANAGEMENT/SOCIAL WORK - NSDCPEFALRISK_GEN_ALL_CORE
For information on Fall & Injury Prevention, visit: [email or web address] OR  [email or web address] OR  https://www.cdc.gov/steadi/patient.html

## 2021-12-12 NOTE — PROGRESS NOTE ADULT - PROVIDER SPECIALTY LIST ADULT
Cardiology
Internal Medicine
Cardiology
Internal Medicine
Cardiology
Critical Care
Critical Care
Internal Medicine

## 2022-10-25 PROBLEM — E03.9 HYPOTHYROIDISM, UNSPECIFIED: Chronic | Status: ACTIVE | Noted: 2021-12-03

## 2022-10-25 PROBLEM — E11.9 TYPE 2 DIABETES MELLITUS WITHOUT COMPLICATIONS: Chronic | Status: ACTIVE | Noted: 2021-12-03

## 2022-10-25 PROBLEM — I10 ESSENTIAL (PRIMARY) HYPERTENSION: Chronic | Status: ACTIVE | Noted: 2021-12-03

## 2022-10-28 ENCOUNTER — LABORATORY RESULT (OUTPATIENT)
Age: 67
End: 2022-10-28

## 2022-10-28 ENCOUNTER — APPOINTMENT (OUTPATIENT)
Dept: OBGYN | Facility: CLINIC | Age: 67
End: 2022-10-28

## 2022-10-28 VITALS
RESPIRATION RATE: 16 BRPM | OXYGEN SATURATION: 96 % | SYSTOLIC BLOOD PRESSURE: 136 MMHG | HEART RATE: 99 BPM | BODY MASS INDEX: 29.19 KG/M2 | TEMPERATURE: 97.2 F | DIASTOLIC BLOOD PRESSURE: 80 MMHG | HEIGHT: 64 IN | WEIGHT: 171 LBS

## 2022-10-28 DIAGNOSIS — Z01.419 ENCOUNTER FOR GYNECOLOGICAL EXAMINATION (GENERAL) (ROUTINE) W/OUT ABNORMAL FINDINGS: ICD-10-CM

## 2022-10-28 PROBLEM — Z00.00 ENCOUNTER FOR PREVENTIVE HEALTH EXAMINATION: Status: ACTIVE | Noted: 2022-10-28

## 2022-10-28 PROCEDURE — 99387 INIT PM E/M NEW PAT 65+ YRS: CPT

## 2022-10-28 NOTE — HISTORY OF PRESENT ILLNESS
[FreeTextEntry1] : RAINA CHANG 68 YO, presents for Annual\par  G 2 P 0020\par 2 Miscarriages\par Menopausal\par Medication Levothyroxine,Farxiga, Metoprolol,Glipizide, Losartan, Amlodipine, Algo/Met\par No Family Hx of Breast Cancer. \par Due for Mammogram. \par Sexually active with a new partner. \par No gyn. complaints.

## 2022-11-01 LAB
C TRACH RRNA SPEC QL NAA+PROBE: NOT DETECTED
N GONORRHOEA RRNA SPEC QL NAA+PROBE: NOT DETECTED
SOURCE TP AMPLIFICATION: NORMAL

## 2022-11-14 ENCOUNTER — APPOINTMENT (OUTPATIENT)
Dept: OBGYN | Facility: CLINIC | Age: 67
End: 2022-11-14

## 2022-11-14 LAB — CYTOLOGY CVX/VAG DOC THIN PREP: ABNORMAL

## 2022-11-14 PROCEDURE — 99441: CPT

## 2022-11-17 ENCOUNTER — APPOINTMENT (OUTPATIENT)
Dept: OBGYN | Facility: CLINIC | Age: 67
End: 2022-11-17

## 2022-11-17 VITALS
HEART RATE: 93 BPM | RESPIRATION RATE: 16 BRPM | SYSTOLIC BLOOD PRESSURE: 138 MMHG | TEMPERATURE: 97.3 F | WEIGHT: 171 LBS | BODY MASS INDEX: 29.19 KG/M2 | HEIGHT: 64 IN | DIASTOLIC BLOOD PRESSURE: 90 MMHG | OXYGEN SATURATION: 96 %

## 2022-11-17 DIAGNOSIS — R87.810 ATYPICAL SQUAMOUS CELLS OF UNDETERMINED SIGNIFICANCE ON CYTOLOGIC SMEAR OF CERVIX (ASC-US): ICD-10-CM

## 2022-11-17 DIAGNOSIS — R87.610 ATYPICAL SQUAMOUS CELLS OF UNDETERMINED SIGNIFICANCE ON CYTOLOGIC SMEAR OF CERVIX (ASC-US): ICD-10-CM

## 2022-11-17 PROCEDURE — 57455 BIOPSY OF CERVIX W/SCOPE: CPT

## 2022-11-17 PROCEDURE — 99213 OFFICE O/P EST LOW 20 MIN: CPT | Mod: 25

## 2022-11-17 NOTE — HISTORY OF PRESENT ILLNESS
[FreeTextEntry1] : RAINA CHANG 66 YO, presents for Colposcopy\par  G 2  P 0020\par 2 Miscarriages\par Menopausal\par Medication Levothyroxine, Farxiga, Metoprolol, Glipizide, Losartan, Amlodipine.\par PAP- ASCUS with HPV Detected. \par Procedure discussed. \par All questions answered. \par \par

## 2022-11-17 NOTE — PROCEDURE
[Colposcopy] : Colposcopy  [Risks] : risks [Benefits] : benefits [Alternatives] : alternatives [Patient] : patient [ASCUS] : ASCUS [HPV High Risk] : HPV high risk [No Premedication] : no premedication [Colposcopy Adequate] : colposcopy adequate [Pap Performed] : pap not performed [SCI Fully Visualized] : SCI fully visualized [No Abnormalities] : no abnormalities [Biopsy] : biopsy taken [Hemostasis Obtained] : Hemostasis obtained [Tolerated Well] : the patient tolerated the procedure well [de-identified] : 2 [de-identified] : Cervix grossly normal & stenotic.

## 2022-11-26 LAB — CORE LAB BIOPSY: NORMAL

## 2022-11-30 ENCOUNTER — APPOINTMENT (OUTPATIENT)
Dept: OBGYN | Facility: CLINIC | Age: 67
End: 2022-11-30

## 2022-11-30 PROCEDURE — 99441: CPT

## 2023-01-03 NOTE — H&P ADULT - ENMT
No oral lesions; no gross abnormalities Dapsone Counseling: I discussed with the patient the risks of dapsone including but not limited to hemolytic anemia, agranulocytosis, rashes, methemoglobinemia, kidney failure, peripheral neuropathy, headaches, GI upset, and liver toxicity.  Patients who start dapsone require monitoring including baseline LFTs and weekly CBCs for the first month, then every month thereafter.  The patient verbalized understanding of the proper use and possible adverse effects of dapsone.  All of the patient's questions and concerns were addressed.

## 2025-03-31 ENCOUNTER — NON-APPOINTMENT (OUTPATIENT)
Age: 70
End: 2025-03-31

## 2025-03-31 ENCOUNTER — APPOINTMENT (OUTPATIENT)
Dept: OBGYN | Facility: CLINIC | Age: 70
End: 2025-03-31

## 2025-03-31 VITALS
TEMPERATURE: 97.7 F | WEIGHT: 178 LBS | SYSTOLIC BLOOD PRESSURE: 146 MMHG | OXYGEN SATURATION: 97 % | RESPIRATION RATE: 16 BRPM | BODY MASS INDEX: 30.39 KG/M2 | HEIGHT: 64 IN | DIASTOLIC BLOOD PRESSURE: 86 MMHG | HEART RATE: 98 BPM

## 2025-03-31 DIAGNOSIS — Z01.419 ENCOUNTER FOR GYNECOLOGICAL EXAMINATION (GENERAL) (ROUTINE) W/OUT ABNORMAL FINDINGS: ICD-10-CM

## 2025-03-31 DIAGNOSIS — R87.610 ATYPICAL SQUAMOUS CELLS OF UNDETERMINED SIGNIFICANCE ON CYTOLOGIC SMEAR OF CERVIX (ASC-US): ICD-10-CM

## 2025-03-31 DIAGNOSIS — R87.810 ATYPICAL SQUAMOUS CELLS OF UNDETERMINED SIGNIFICANCE ON CYTOLOGIC SMEAR OF CERVIX (ASC-US): ICD-10-CM

## 2025-03-31 PROCEDURE — 99397 PER PM REEVAL EST PAT 65+ YR: CPT

## 2025-04-07 ENCOUNTER — APPOINTMENT (OUTPATIENT)
Dept: OBGYN | Facility: CLINIC | Age: 70
End: 2025-04-07
Payer: COMMERCIAL

## 2025-04-07 ENCOUNTER — ASOB RESULT (OUTPATIENT)
Age: 70
End: 2025-04-07

## 2025-04-07 PROCEDURE — 76830 TRANSVAGINAL US NON-OB: CPT

## 2025-04-11 LAB — CYTOLOGY CVX/VAG DOC THIN PREP: ABNORMAL

## 2025-04-28 ENCOUNTER — APPOINTMENT (OUTPATIENT)
Dept: OBGYN | Facility: CLINIC | Age: 70
End: 2025-04-28
Payer: COMMERCIAL

## 2025-04-28 PROCEDURE — 99213 OFFICE O/P EST LOW 20 MIN: CPT | Mod: 93
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
